# Patient Record
Sex: MALE | Race: WHITE | NOT HISPANIC OR LATINO
[De-identification: names, ages, dates, MRNs, and addresses within clinical notes are randomized per-mention and may not be internally consistent; named-entity substitution may affect disease eponyms.]

---

## 2017-04-03 ENCOUNTER — RX RENEWAL (OUTPATIENT)
Age: 62
End: 2017-04-03

## 2017-06-09 ENCOUNTER — APPOINTMENT (OUTPATIENT)
Dept: CARDIOLOGY | Facility: CLINIC | Age: 62
End: 2017-06-09

## 2017-06-30 ENCOUNTER — RX RENEWAL (OUTPATIENT)
Age: 62
End: 2017-06-30

## 2017-07-07 ENCOUNTER — NON-APPOINTMENT (OUTPATIENT)
Age: 62
End: 2017-07-07

## 2017-07-07 ENCOUNTER — APPOINTMENT (OUTPATIENT)
Dept: CARDIOLOGY | Facility: CLINIC | Age: 62
End: 2017-07-07

## 2017-07-07 VITALS
OXYGEN SATURATION: 98 % | SYSTOLIC BLOOD PRESSURE: 148 MMHG | BODY MASS INDEX: 27.26 KG/M2 | HEART RATE: 67 BPM | WEIGHT: 190 LBS | DIASTOLIC BLOOD PRESSURE: 76 MMHG

## 2017-08-24 ENCOUNTER — APPOINTMENT (OUTPATIENT)
Dept: CARDIOLOGY | Facility: CLINIC | Age: 62
End: 2017-08-24

## 2017-09-26 ENCOUNTER — APPOINTMENT (OUTPATIENT)
Dept: CARDIOLOGY | Facility: CLINIC | Age: 62
End: 2017-09-26
Payer: MEDICARE

## 2017-09-26 PROCEDURE — 93880 EXTRACRANIAL BILAT STUDY: CPT

## 2017-10-05 ENCOUNTER — MEDICATION RENEWAL (OUTPATIENT)
Age: 62
End: 2017-10-05

## 2017-12-11 RX ORDER — TADALAFIL 5 MG/1
5 TABLET, FILM COATED ORAL
Qty: 30 | Refills: 3 | Status: DISCONTINUED | COMMUNITY
Start: 2017-05-22 | End: 2017-12-11

## 2017-12-21 ENCOUNTER — APPOINTMENT (OUTPATIENT)
Dept: CARDIOLOGY | Facility: CLINIC | Age: 62
End: 2017-12-21

## 2018-01-16 ENCOUNTER — RX RENEWAL (OUTPATIENT)
Age: 63
End: 2018-01-16

## 2018-04-18 ENCOUNTER — RX RENEWAL (OUTPATIENT)
Age: 63
End: 2018-04-18

## 2018-04-27 ENCOUNTER — APPOINTMENT (OUTPATIENT)
Dept: CARDIOLOGY | Facility: CLINIC | Age: 63
End: 2018-04-27
Payer: MEDICARE

## 2018-05-21 ENCOUNTER — RX RENEWAL (OUTPATIENT)
Age: 63
End: 2018-05-21

## 2018-05-21 ENCOUNTER — NON-APPOINTMENT (OUTPATIENT)
Age: 63
End: 2018-05-21

## 2018-05-21 ENCOUNTER — APPOINTMENT (OUTPATIENT)
Dept: CARDIOLOGY | Facility: CLINIC | Age: 63
End: 2018-05-21
Payer: MEDICARE

## 2018-05-21 VITALS — BODY MASS INDEX: 27.35 KG/M2 | HEIGHT: 70 IN | WEIGHT: 191 LBS

## 2018-05-21 VITALS
WEIGHT: 191 LBS | BODY MASS INDEX: 27.35 KG/M2 | HEIGHT: 70 IN | DIASTOLIC BLOOD PRESSURE: 73 MMHG | HEART RATE: 71 BPM | SYSTOLIC BLOOD PRESSURE: 128 MMHG | OXYGEN SATURATION: 95 %

## 2018-05-21 PROCEDURE — 99214 OFFICE O/P EST MOD 30 MIN: CPT | Mod: 25

## 2018-05-21 PROCEDURE — 93000 ELECTROCARDIOGRAM COMPLETE: CPT

## 2018-06-27 ENCOUNTER — RX RENEWAL (OUTPATIENT)
Age: 63
End: 2018-06-27

## 2018-12-18 ENCOUNTER — EMERGENCY (EMERGENCY)
Facility: HOSPITAL | Age: 63
LOS: 1 days | Discharge: HOME | End: 2018-12-18
Attending: EMERGENCY MEDICINE | Admitting: EMERGENCY MEDICINE
Payer: MEDICARE

## 2018-12-18 VITALS
DIASTOLIC BLOOD PRESSURE: 94 MMHG | HEART RATE: 79 BPM | RESPIRATION RATE: 16 BRPM | SYSTOLIC BLOOD PRESSURE: 201 MMHG | HEIGHT: 70 IN | WEIGHT: 188.05 LBS | OXYGEN SATURATION: 95 % | TEMPERATURE: 99 F

## 2018-12-18 VITALS
HEART RATE: 66 BPM | SYSTOLIC BLOOD PRESSURE: 172 MMHG | RESPIRATION RATE: 16 BRPM | TEMPERATURE: 98 F | DIASTOLIC BLOOD PRESSURE: 85 MMHG | OXYGEN SATURATION: 98 %

## 2018-12-18 DIAGNOSIS — I65.22 OCCLUSION AND STENOSIS OF LEFT CAROTID ARTERY: ICD-10-CM

## 2018-12-18 DIAGNOSIS — I10 ESSENTIAL (PRIMARY) HYPERTENSION: ICD-10-CM

## 2018-12-18 DIAGNOSIS — G45.9 TRANSIENT CEREBRAL ISCHEMIC ATTACK, UNSPECIFIED: ICD-10-CM

## 2018-12-18 DIAGNOSIS — Z98.89 OTHER SPECIFIED POSTPROCEDURAL STATES: Chronic | ICD-10-CM

## 2018-12-18 DIAGNOSIS — R51 HEADACHE: ICD-10-CM

## 2018-12-18 DIAGNOSIS — R07.9 CHEST PAIN, UNSPECIFIED: ICD-10-CM

## 2018-12-18 DIAGNOSIS — I25.10 ATHEROSCLEROTIC HEART DISEASE OF NATIVE CORONARY ARTERY WITHOUT ANGINA PECTORIS: ICD-10-CM

## 2018-12-18 DIAGNOSIS — F10.10 ALCOHOL ABUSE, UNCOMPLICATED: ICD-10-CM

## 2018-12-18 LAB
ALBUMIN SERPL ELPH-MCNC: 3.7 G/DL — SIGNIFICANT CHANGE UP (ref 3.3–5)
ALP SERPL-CCNC: 62 U/L — SIGNIFICANT CHANGE UP (ref 30–120)
ALT FLD-CCNC: 29 U/L DA — SIGNIFICANT CHANGE UP (ref 10–60)
ANION GAP SERPL CALC-SCNC: 11 MMOL/L — SIGNIFICANT CHANGE UP (ref 5–17)
APPEARANCE UR: CLEAR — SIGNIFICANT CHANGE UP
AST SERPL-CCNC: 20 U/L — SIGNIFICANT CHANGE UP (ref 10–40)
BASOPHILS # BLD AUTO: 0.03 K/UL — SIGNIFICANT CHANGE UP (ref 0–0.2)
BASOPHILS NFR BLD AUTO: 0.7 % — SIGNIFICANT CHANGE UP (ref 0–2)
BILIRUB SERPL-MCNC: 0.5 MG/DL — SIGNIFICANT CHANGE UP (ref 0.2–1.2)
BILIRUB UR-MCNC: NEGATIVE — SIGNIFICANT CHANGE UP
BUN SERPL-MCNC: 11 MG/DL — SIGNIFICANT CHANGE UP (ref 7–23)
CALCIUM SERPL-MCNC: 8.5 MG/DL — SIGNIFICANT CHANGE UP (ref 8.4–10.5)
CHLORIDE SERPL-SCNC: 103 MMOL/L — SIGNIFICANT CHANGE UP (ref 96–108)
CO2 SERPL-SCNC: 26 MMOL/L — SIGNIFICANT CHANGE UP (ref 22–31)
COLOR SPEC: SIGNIFICANT CHANGE UP
CREAT SERPL-MCNC: 0.56 MG/DL — SIGNIFICANT CHANGE UP (ref 0.5–1.3)
DIFF PNL FLD: ABNORMAL
EOSINOPHIL # BLD AUTO: 0.11 K/UL — SIGNIFICANT CHANGE UP (ref 0–0.5)
EOSINOPHIL NFR BLD AUTO: 2.5 % — SIGNIFICANT CHANGE UP (ref 0–6)
ETHANOL SERPL-MCNC: <3 MG/DL — SIGNIFICANT CHANGE UP (ref 0–3)
GLUCOSE SERPL-MCNC: 114 MG/DL — HIGH (ref 70–99)
GLUCOSE UR QL: NEGATIVE — SIGNIFICANT CHANGE UP
HCT VFR BLD CALC: 43.1 % — SIGNIFICANT CHANGE UP (ref 39–50)
HGB BLD-MCNC: 15.2 G/DL — SIGNIFICANT CHANGE UP (ref 13–17)
IMM GRANULOCYTES NFR BLD AUTO: 0.2 % — SIGNIFICANT CHANGE UP (ref 0–1.5)
KETONES UR-MCNC: NEGATIVE — SIGNIFICANT CHANGE UP
LEUKOCYTE ESTERASE UR-ACNC: NEGATIVE — SIGNIFICANT CHANGE UP
LYMPHOCYTES # BLD AUTO: 1.36 K/UL — SIGNIFICANT CHANGE UP (ref 1–3.3)
LYMPHOCYTES # BLD AUTO: 31.3 % — SIGNIFICANT CHANGE UP (ref 13–44)
MCHC RBC-ENTMCNC: 32.7 PG — SIGNIFICANT CHANGE UP (ref 27–34)
MCHC RBC-ENTMCNC: 35.3 GM/DL — SIGNIFICANT CHANGE UP (ref 32–36)
MCV RBC AUTO: 92.7 FL — SIGNIFICANT CHANGE UP (ref 80–100)
MONOCYTES # BLD AUTO: 0.26 K/UL — SIGNIFICANT CHANGE UP (ref 0–0.9)
MONOCYTES NFR BLD AUTO: 6 % — SIGNIFICANT CHANGE UP (ref 2–14)
NEUTROPHILS # BLD AUTO: 2.58 K/UL — SIGNIFICANT CHANGE UP (ref 1.8–7.4)
NEUTROPHILS NFR BLD AUTO: 59.3 % — SIGNIFICANT CHANGE UP (ref 43–77)
NITRITE UR-MCNC: NEGATIVE — SIGNIFICANT CHANGE UP
PH UR: 7 — SIGNIFICANT CHANGE UP (ref 5–8)
PLATELET # BLD AUTO: 132 K/UL — LOW (ref 150–400)
POTASSIUM SERPL-MCNC: 3.8 MMOL/L — SIGNIFICANT CHANGE UP (ref 3.5–5.3)
POTASSIUM SERPL-SCNC: 3.8 MMOL/L — SIGNIFICANT CHANGE UP (ref 3.5–5.3)
PROT SERPL-MCNC: 7.5 G/DL — SIGNIFICANT CHANGE UP (ref 6–8.3)
PROT UR-MCNC: 30 MG/DL
RBC # BLD: 4.65 M/UL — SIGNIFICANT CHANGE UP (ref 4.2–5.8)
SODIUM SERPL-SCNC: 140 MMOL/L — SIGNIFICANT CHANGE UP (ref 135–145)
SP GR SPEC: 1 — LOW (ref 1.01–1.02)
TROPONIN I SERPL-MCNC: 0 NG/ML — LOW (ref 0.02–0.06)
UROBILINOGEN FLD QL: NEGATIVE — SIGNIFICANT CHANGE UP
WBC # BLD: 4.35 K/UL — SIGNIFICANT CHANGE UP (ref 3.8–10.5)
WBC # FLD AUTO: 4.35 K/UL — SIGNIFICANT CHANGE UP (ref 3.8–10.5)

## 2018-12-18 PROCEDURE — 93005 ELECTROCARDIOGRAM TRACING: CPT

## 2018-12-18 PROCEDURE — 99284 EMERGENCY DEPT VISIT MOD MDM: CPT | Mod: 25

## 2018-12-18 PROCEDURE — 36415 COLL VENOUS BLD VENIPUNCTURE: CPT

## 2018-12-18 PROCEDURE — 84484 ASSAY OF TROPONIN QUANT: CPT

## 2018-12-18 PROCEDURE — 85027 COMPLETE CBC AUTOMATED: CPT

## 2018-12-18 PROCEDURE — 99284 EMERGENCY DEPT VISIT MOD MDM: CPT

## 2018-12-18 PROCEDURE — 93010 ELECTROCARDIOGRAM REPORT: CPT

## 2018-12-18 PROCEDURE — 96360 HYDRATION IV INFUSION INIT: CPT

## 2018-12-18 PROCEDURE — 80053 COMPREHEN METABOLIC PANEL: CPT

## 2018-12-18 PROCEDURE — 81001 URINALYSIS AUTO W/SCOPE: CPT

## 2018-12-18 PROCEDURE — 80307 DRUG TEST PRSMV CHEM ANLYZR: CPT

## 2018-12-18 RX ORDER — SODIUM CHLORIDE 9 MG/ML
1000 INJECTION INTRAMUSCULAR; INTRAVENOUS; SUBCUTANEOUS ONCE
Qty: 0 | Refills: 0 | Status: COMPLETED | OUTPATIENT
Start: 2018-12-18 | End: 2018-12-18

## 2018-12-18 RX ORDER — ATENOLOL 25 MG/1
50 TABLET ORAL ONCE
Qty: 0 | Refills: 0 | Status: COMPLETED | OUTPATIENT
Start: 2018-12-18 | End: 2018-12-18

## 2018-12-18 RX ADMIN — ATENOLOL 50 MILLIGRAM(S): 25 TABLET ORAL at 12:21

## 2018-12-18 RX ADMIN — SODIUM CHLORIDE 1000 MILLILITER(S): 9 INJECTION INTRAMUSCULAR; INTRAVENOUS; SUBCUTANEOUS at 12:21

## 2018-12-18 RX ADMIN — SODIUM CHLORIDE 1000 MILLILITER(S): 9 INJECTION INTRAMUSCULAR; INTRAVENOUS; SUBCUTANEOUS at 13:30

## 2018-12-18 NOTE — ED PROVIDER NOTE - MEDICAL DECISION MAKING DETAILS
62 y/o F with dry mouth and pressure in head since this morning, admits to drinking excessive amount of etoh last night and might have forgotten to take his BP med, no neuro deficits, BP: 64 y/o F with dry mouth and pressure in head since this morning, admits to drinking excessive amount of etoh last night and might have forgotten to take his BP med, no neuro deficits, BP:193/101 now, will get ekg, labs, IVF, dose of atenolol, re-assess

## 2018-12-18 NOTE — ED PROVIDER NOTE - PROGRESS NOTE DETAILS
Pt examined by ED attending, Dr. Kaufman who agreed with disposition and plan. Attending Contribution to Care: 64 y/o M pt with hx of HTN, CAD drank excessive amount of EtOH last night, thinks he forgot to take BP pill. Woke up with extremely dry mouth and pressure in head. Denies CP, SOB, N/V, other sx. PMD: Meghan. PE: /90, NAD, neuro intact, heart and lungs normal, EKG normal. Plan: labs with alcohol level, IV fluids, may need dose of BP meds then reassess. Pt seen by her PMD, Dr. Christianson in ED, agreed with plan, manage BP and d/c home Reevaluated patient at bedside.  Patient feeling much improved. BP improved: 167/83. Discussed the results of all diagnostic testing in ED and copies of all reports given.   An opportunity to ask questions was given.  Discussed the importance of prompt, close medical follow-up.  Patient will return with any changes, concerns or persistent / worsening symptoms.  Understanding of all instructions verbalized.

## 2018-12-18 NOTE — PROGRESS NOTE ADULT - REASON FOR ADMISSION
CC Dry mouth, pressure in his head & Lt arm numbness. CC Dry mouth, pressure in his head, some minor discomfort on his left midupper chest & by phone to my office c/o Lt arm numbness.

## 2018-12-18 NOTE — ED ADULT NURSE NOTE - CHPI ED NUR SYMPTOMS NEG
no shortness of breath/no fever/no syncope/no diaphoresis/no congestion/no dizziness/no vomiting/no nausea/no back pain/no chills

## 2018-12-18 NOTE — PROGRESS NOTE ADULT - SUBJECTIVE AND OBJECTIVE BOX
· HPI Objective Statement: 62 y/o M with hx of HTN, HLD presents with c/o dry mouth x today. Pt states that he drank 5 glasses to wine, scotch and a cognac last night and thinks he forgot to take his BP medication (atenolol 50mg daily).  States that he woke up this morning with dry mouth and pressure in his head. Denies n/v, CP, SOB, abdominal pain, vision changes, numbness, tingling, focal weakness, polyuria, fever, pain or other symptoms. PMD: Dr. Christianson	  · Presenting Symptoms: dry mouth, pressure in head	  · Negative Findings: no chills, no dizziness, no fever, no nausea, no numbness, no pain, no tingling, no vomiting, no weakness	  · Timing: sudden onset	  · Duration: today	  · Aggravating Factors: NONE	  · Relieving Factors: NONE	    REVIEW OF SYSTEMS:   Review of Systems:  · CONSTITUTIONAL: no fever and no chills.	  · ENMT: - - -	  · Mouth/Throat [+]: dry mouth	  · RESPIRATORY: no chest pain, no cough, and no shortness of breath.	  · GASTROINTESTINAL: no abdominal pain, no bloating, no constipation, no diarrhea, no nausea and no vomiting.	  · NEUROLOGICAL: - - -	  · Neurological [+]: pressure in head	  · ROS STATEMENT: all other ROS negative except as per HPI	  VITAL SIGNS( [place]):   ,,ED ADULT Flow Sheet:    18-Dec-2018 11:09	  · Temp (F): 98.6	  · Temp (C) Temp (C): 37	  · Temp site Temp Site: oral	  · Heart Rate Heart Rate (beats/min): 79	  · BP Systolic Systolic: 201	  · BP Diastolic Diastolic (mm Hg): 94	  · Respiration Rate (breaths/min) Respiration Rate (breaths/min): 16	  · SpO2 (%) SpO2 (%): 95	  · O2 delivery Patient On: room air	  · How was the weight captured? Weight Type/Method: stated	  · Dosing Weight (KILOGRAMS) Dosing Weight (KILOGRAMS): 85.3	  · Dosing Weight  (POUNDS) Dosing Weight (POUNDS): 188	  · Height type Height Type: stated	  · Height (FEET) Height (FEET): 5	  · Height (INCHES) Height (INCHES): 10	  · Height (CENTIMETERS) Height (CENTIMETERS): 177.8	  · BSA (m2): 2.03	  · BMI (kG/m2) BMI (kG/m2): 27	  · Presence of Pain: complains of pain/discomfort	  · Pain Rating (0-10): Rest: 0	  · Pain Rating (0-10): Activity: 0	  · SpO2 (%) SpO2 (%): 95	  · O2 delivery Patient On: room air	    11:20	  · Presence of Pain: denies pain/discomfort	  · Pain Rating (0-10): Rest: 0	  · Pain Rating (0-10): Activity: 0	  · Preferred Language to Address Healthcare Preferred Language to Address Healthcare: English	  · Patient Belongings Patient Belongings:: Clothing	  · Extensions of Self Extensions of Self: None	    PHYSICAL EXAM:   · CONSTITUTIONAL: Well appearing, well nourished, awake, alert, oriented to person, place, time/situation and in no apparent distress.	  · ENMT: Airway patent, Nasal mucosa clear. Mouth with normal mucosa.	  · EYES: Clear bilaterally, pupils equal, round and reactive to light. EOMI	  · CARDIAC: Normal rate, regular rhythm.  Heart sounds S1, S2.	  · RESPIRATORY: Breath sounds clear and equal bilaterally.	  · GASTROINTESTINAL: Abdomen soft, non-tender, no guarding.	  · MUSCULOSKELETAL: Spine appears normal, range of motion is not limited, no muscle or joint tenderness	  · NEUROLOGICAL: Alert and oriented, no focal deficits, no motor or sensory deficits.	  · SKIN: Skin normal color for race, warm, dry and intact. No evidence of rash. · HPI Objective Statement: 64 y/o M with hx of HTN, HLD presents with c/o dry mouth x today. Pt states that he drank 5 glasses to wine, scotch and a cognac last night and thinks he forgot to take his BP medication (atenolol 50mg daily).  States that he woke up this morning with dry mouth and pressure in his head. Denies n/v, CP, SOB, abdominal pain, vision changes, numbness, tingling, focal weakness, polyuria, fever, pain or other symptoms. PMD: Dr. Christianson	  · Presenting Symptoms: dry mouth, pressure in head, some minor discomfort on his left midupper chest, non-pleuritic/ dys/odinophagia, tenderness, trauma, rash. Denied any radiation to jaw, neck, arm,	  · Negative Findings: no chills, no dizziness, no fever, no nausea, no numbness, no pain, no tingling, no vomiting, no weakness	  · Timing: sudden onset	  · Duration: today	  · Aggravating Factors: NONE	  · Relieving Factors: NONE	    REVIEW OF SYSTEMS:   Review of Systems:  · CONSTITUTIONAL: no fever and no chills.	  · ENMT: - - -	  · Mouth/Throat [+]: dry mouth	  · RESPIRATORY: no chest pain, no cough, and no shortness of breath.	  · GASTROINTESTINAL: no abdominal pain, no bloating, no constipation, no diarrhea, no nausea and no vomiting.	  · NEUROLOGICAL: - - -	  · Neurological [+]: pressure in head	  · ROS STATEMENT: all other ROS negative except as per HPI	  VITAL SIGNS( Pullset):   ,,ED ADULT Flow Sheet:    18-Dec-2018 11:09	  · Temp (F): 98.6	  · Temp (C) Temp (C): 37	  · Temp site Temp Site: oral	  · Heart Rate Heart Rate (beats/min): 79	  · BP Systolic Systolic: 201	  · BP Diastolic Diastolic (mm Hg): 94	  · Respiration Rate (breaths/min) Respiration Rate (breaths/min): 16	  · SpO2 (%) SpO2 (%): 95	  · O2 delivery Patient On: room air	  · How was the weight captured? Weight Type/Method: stated	  · Dosing Weight (KILOGRAMS) Dosing Weight (KILOGRAMS): 85.3	  · Dosing Weight  (POUNDS) Dosing Weight (POUNDS): 188	  · Height type Height Type: stated	  · Height (FEET) Height (FEET): 5	  · Height (INCHES) Height (INCHES): 10	  · Height (CENTIMETERS) Height (CENTIMETERS): 177.8	  · BSA (m2): 2.03	  · BMI (kG/m2) BMI (kG/m2): 27	  · Presence of Pain: complains of pain/discomfort	  · Pain Rating (0-10): Rest: 0	  · Pain Rating (0-10): Activity: 0	  · SpO2 (%) SpO2 (%): 95	  · O2 delivery Patient On: room air	    11:20	  · Presence of Pain: denies pain/discomfort	  · Pain Rating (0-10): Rest: 0	  · Pain Rating (0-10): Activity: 0	  · Preferred Language to Address Healthcare Preferred Language to Address Healthcare: English	  · Patient Belongings Patient Belongings:: Clothing	  · Extensions of Self Extensions of Self: None	    PHYSICAL EXAM:   · CONSTITUTIONAL: Well appearing, well nourished, awake, alert, oriented to person, place, time/situation and in no apparent distress.	  · ENMT: Airway patent, Nasal mucosa clear. Mouth with normal mucosa.	  · EYES: Clear bilaterally, pupils equal, round and reactive to light. EOMI	  · CARDIAC: Normal rate, regular rhythm.  Heart sounds S1, S2.	  · RESPIRATORY: Breath sounds clear and equal bilaterally.	  · GASTROINTESTINAL: Abdomen soft, non-tender, no guarding.	  · MUSCULOSKELETAL: Spine appears normal, range of motion is not limited, no muscle or joint tenderness	  · NEUROLOGICAL: Alert and oriented, no focal deficits, no motor or sensory deficits.	  · SKIN: Skin normal color for race, warm, dry and intact. No evidence of rash.	  Troponin I, Serum: .000: The new reference range for Troponin-I performed on the Siemens EXL  system is 0.017-0.056 ng/mL which includes the 99th percentile of a  healthy reference population. Studies have shown that elevated troponin  levels above the cutoff are associated with an increased risk for adverse  cardiac events, with the risk increasing as troponin levels increase.  As  per a joint committee of the American College of Cardiology and   Society of Cardiology, diagnosis of classic MI is based upon the  detection of a rise or fall of cardiac troponin values, with at least one  value above the 99th percentile upper reference limit, in the appropriate  clinical context.  · Troponin I (ng/mL) Interpretation  · 0.017-0.056  Normal range (includes the 99th percentile of a healthy  reference population)  · >0.056  Elevated troponin level indicating increased risk  · Note: Troponin-I and Troponin T cannot be used interchangeably in  serial measurements.  Minimally elevated Troponin results should be  interpreted in the context of clinical findings and risk factors. ng/mL (12.18.18 @ 12:26)    Comprehensive Metabolic Panel (12.18.18 @ 11:57)    Sodium, Serum: 140 mmol/L    Potassium, Serum: 3.8 mmol/L    Chloride, Serum: 103 mmol/L    Carbon Dioxide, Serum: 26 mmol/L    Anion Gap, Serum: 11 mmol/L    Blood Urea Nitrogen, Serum: 11 mg/dL    Creatinine, Serum: 0.56 mg/dL    Glucose, Serum: 114 mg/dL    Calcium, Total Serum: 8.5 mg/dL    Protein Total, Serum: 7.5 g/dL    Albumin, Serum: 3.7 g/dL    Bilirubin Total, Serum: 0.5 mg/dL    Alkaline Phosphatase, Serum: 62 U/L    Aspartate Aminotransferase (AST/SGOT): 20 U/L    Alanine Aminotransferase (ALT/SGPT): 29 U/L DA    eGFR if Non : 109: Interpretative comment  The units for eGFR are mL/min/1.73M2 (normalized body surface area). The  eGFR is calculated from a serum creatinine using the CKD-EPI equation.  Other variables required for calculation are race, age and sex. Among  patients with chronic kidney disease (CKD), the eGFR is useful in  determining the stage of disease according to KDOQI CKD classification.  All eGFR results are reported numerically with the following  interpretation.          GFR                    With                 Without     (ml/min/1.73 m2)    Kidney Damage       Kidney Damage        >= 90                    Stage 1                     Normal        60-89                    Stage 2                     Decreased GFR        30-59     Stage 3                     Stage 3        15-29                    Stage 4                     Stage 4        < 15                      Stage 5                     Stage 5  Each stage of CKD assumes that the associated GFR level has been in  effect for at least 3 months. Determination of stages one and two (with  eGFR > 59 ml/min/m2) requires estimation of kidney damage for at least 3  months as defined by structural or functional abnormalities.  Limitations: All estimates of GFR will be less accurate for patients at  extremes of muscle mass (including but not limited to frail elderly,  critically ill, or cancer patients), those with unusual diets, and those  with conditions associated with reduced secretion or extrarenal  elimination of creatinine. The eGFR equation is not recommended for use  in patients with unstable creatinine levels. mL/min/1.73M2    eGFR if African American: 127 mL/min/1.73M2    Alcohol, Blood (12.18.18 @ 11:57)    Alcohol, Blood: <3: TOXIC CONCENTRATIONS (mg/dL):  Flushing, Slowing of  Reflexes, Impaired Visual Acuity:     Depression of CNS:    > 100  Fatalities Reported:       > 400  Results reported as a "< number" are below reliably detectable limits and  considered negative  These ranges are intended as general guidelines.  Alcohol metabolism can vary widely among individuals.  This test  is approved for clinical and not for forensic purposes. mg/dL    Urine Microscopic-Add On (NC) (12.18.18 @ 12:04)    Red Blood Cell - Urine: 0-2 /HPF    White Blood Cell - Urine: 0-2    Epithelial Cells: Few    Urinalysis (12.18.18 @ 12:04)    Glucose Qualitative, Urine: Negative    Blood, Urine: Trace    pH Urine: 7.0    Color: Pale Yellow    Urine Appearance: Clear    Bilirubin: Negative    Ketone - Urine: Negative    Specific Gravity: 1.005    Protein, Urine: 30 mg/dL    Urobilinogen: Negative    Nitrite: Negative    Leukocyte Esterase Concentration: Negative    Complete Blood Count + Automated Diff (12.18.18 @ 11:57)    WBC Count: 4.35 K/uL    RBC Count: 4.65 M/uL    Hemoglobin: 15.2 g/dL    Hematocrit: 43.1 %    Mean Cell Volume: 92.7 fl    Mean Cell Hemoglobin: 32.7 pg    Mean Cell Hemoglobin Conc: 35.3 gm/dL    Platelet Count - Automated: 132 K/uL    Auto Neutrophil #: 2.58 K/uL    Auto Lymphocyte #: 1.36 K/uL    Auto Monocyte #: 0.26 K/uL    Auto Eosinophil #: 0.11 K/uL    Auto Basophil #: 0.03 K/uL    Auto Neutrophil %: 59.3: Differential percentages must be correlated with absolute numbers for  clinical significance. %    Auto Lymphocyte %: 31.3 %    Auto Monocyte %: 6.0 %    Auto Eosinophil %: 2.5 %    Auto Basophil %: 0.7 %    Auto Immature Granulocyte %: 0.2 %

## 2018-12-18 NOTE — PROGRESS NOTE ADULT - ASSESSMENT
64 yo male h/o ASHD/ episode of Pulm edema in past, HTN, HLD. Who was drinking ETOH last PM tioll 2 AM, today got worried /c dry mouth, HA & Lt chest NS discomfort, now resolved, [both HA & C/P] he thinks he forgot to take his BP Rx, SBP in ED >200.  EKG NSR, no ischemic changes or tachy/beau/ arrhythmia.  Labs CE's x 1 [-], CC non-specific.    Plan if BP controlled & asymptomatic could be discharged home, to f/u in office, continue /c his meds, 7& no ETOH.

## 2018-12-18 NOTE — ED ADULT NURSE NOTE - PMH
CAD in native artery    Carotid stenosis    Gastric reflux    Hyperlipemia    Hypertension    TIA (transient ischemic attack)

## 2018-12-18 NOTE — ED PROVIDER NOTE - OBJECTIVE STATEMENT
62 y/o M with hx of HTN, HLD presents with c/o dry mouth x today. Pt states that he drank 5 glasses to wine, scotch and a cognac last night and thinks he forgot to take his BP medication. States that he 64 y/o M with hx of HTN, HLD presents with c/o dry mouth x today. Pt states that he drank 5 glasses to wine, scotch and a cognac last night and thinks he forgot to take his BP medication (atenolol 50mg daily).  States that he woke up this morning with dry mouth and pressure in his head. Denies n/v, CP, SOB, abdominal pain, vision changes, numbness, tingling, focal weakness, polyuria, fever, pain or other symptoms.  PMD: Dr. Christianson

## 2018-12-18 NOTE — ED ADULT NURSE NOTE - NSIMPLEMENTINTERV_GEN_ALL_ED
Implemented All Universal Safety Interventions:  [place] to call system. Call bell, personal items and telephone within reach. Instruct patient to call for assistance. Room bathroom lighting operational. Non-slip footwear when patient is off stretcher. Physically safe environment: no spills, clutter or unnecessary equipment. Stretcher in lowest position, wheels locked, appropriate side rails in place.

## 2018-12-18 NOTE — ED ADULT NURSE NOTE - OBJECTIVE STATEMENT
Patient presents c/o dry mouth and feels he needs to drink a lot of water since he woke up this morning. Patient also describes an intermittent feeling in his chest localized under his left arm. Patient has no pain but states his head felt like it had pressure. Patient took his BP meds as prescribed last night but also admits to drinking alcohol last night. BP noted to be elevated and patient appears flushed in the face. Patient presents c/o dry mouth and feels he needs to drink a lot of water since he woke up this morning. Patient also describes an intermittent feeling in his chest localized under his left arm. Patient has no pain but states his head felt like it had pressure. Patient took his BP meds as prescribed last night but also admits to drinking alcohol last night more than he usually does. BP noted to be elevated and patient appears flushed in the face.

## 2019-05-07 ENCOUNTER — RX RENEWAL (OUTPATIENT)
Age: 64
End: 2019-05-07

## 2019-05-08 ENCOUNTER — RX RENEWAL (OUTPATIENT)
Age: 64
End: 2019-05-08

## 2019-06-10 ENCOUNTER — MEDICATION RENEWAL (OUTPATIENT)
Age: 64
End: 2019-06-10

## 2019-07-01 ENCOUNTER — NON-APPOINTMENT (OUTPATIENT)
Age: 64
End: 2019-07-01

## 2019-07-01 ENCOUNTER — APPOINTMENT (OUTPATIENT)
Dept: CARDIOLOGY | Facility: CLINIC | Age: 64
End: 2019-07-01
Payer: MEDICARE

## 2019-07-01 VITALS
HEIGHT: 70 IN | BODY MASS INDEX: 28.63 KG/M2 | SYSTOLIC BLOOD PRESSURE: 155 MMHG | OXYGEN SATURATION: 98 % | HEART RATE: 78 BPM | DIASTOLIC BLOOD PRESSURE: 87 MMHG | WEIGHT: 200 LBS

## 2019-07-01 PROCEDURE — 99215 OFFICE O/P EST HI 40 MIN: CPT | Mod: 25

## 2019-07-01 PROCEDURE — 93000 ELECTROCARDIOGRAM COMPLETE: CPT

## 2019-07-01 NOTE — DISCUSSION/SUMMARY
[FreeTextEntry1] : 62 Y/O gentleman with above history who presents today in routine cardiac follow up w\par Will obtain yearly Echo/carotid US \par Will obtain ETT/Myoview to eval for ischemia in pt with prior stent.

## 2019-07-01 NOTE — HISTORY OF PRESENT ILLNESS
[FreeTextEntry1] : 62 Y/O gentleman PMH: CAD/RCA stent 2013, Nuclear stress/cath 2015 widely patient RCA stent, Carotid disease S/P left endarterectomy, HTN, HLD who presents in routine cardiac follow up. Pt was recently in [place] for 8 months. Pt denies chest pain or shortness of breath but is anticipating initiation of an exercise program including jogging.

## 2019-07-02 LAB
25(OH)D3 SERPL-MCNC: 35.7 NG/ML
ALBUMIN SERPL ELPH-MCNC: 4.4 G/DL
ALP BLD-CCNC: 63 U/L
ALT SERPL-CCNC: 27 U/L
ANION GAP SERPL CALC-SCNC: 13 MMOL/L
AST SERPL-CCNC: 16 U/L
BASOPHILS # BLD AUTO: 0.03 K/UL
BASOPHILS NFR BLD AUTO: 0.6 %
BILIRUB SERPL-MCNC: 0.5 MG/DL
BUN SERPL-MCNC: 13 MG/DL
CALCIUM SERPL-MCNC: 9.2 MG/DL
CHLORIDE SERPL-SCNC: 103 MMOL/L
CHOLEST SERPL-MCNC: 154 MG/DL
CHOLEST/HDLC SERPL: 3.9 RATIO
CO2 SERPL-SCNC: 24 MMOL/L
CREAT SERPL-MCNC: 0.74 MG/DL
EOSINOPHIL # BLD AUTO: 0.21 K/UL
EOSINOPHIL NFR BLD AUTO: 4.3 %
ESTIMATED AVERAGE GLUCOSE: 103 MG/DL
GLUCOSE SERPL-MCNC: 120 MG/DL
HBA1C MFR BLD HPLC: 5.2 %
HCT VFR BLD CALC: 46.5 %
HDLC SERPL-MCNC: 40 MG/DL
HGB BLD-MCNC: 15.8 G/DL
IMM GRANULOCYTES NFR BLD AUTO: 0.2 %
LDLC SERPL CALC-MCNC: 100 MG/DL
LYMPHOCYTES # BLD AUTO: 1.85 K/UL
LYMPHOCYTES NFR BLD AUTO: 38.2 %
MAN DIFF?: NORMAL
MCHC RBC-ENTMCNC: 32.5 PG
MCHC RBC-ENTMCNC: 34 GM/DL
MCV RBC AUTO: 95.7 FL
MONOCYTES # BLD AUTO: 0.39 K/UL
MONOCYTES NFR BLD AUTO: 8.1 %
NEUTROPHILS # BLD AUTO: 2.35 K/UL
NEUTROPHILS NFR BLD AUTO: 48.6 %
PLATELET # BLD AUTO: 140 K/UL
POTASSIUM SERPL-SCNC: 4.2 MMOL/L
PROT SERPL-MCNC: 6.9 G/DL
PSA SERPL-MCNC: 0.58 NG/ML
RBC # BLD: 4.86 M/UL
RBC # FLD: 11.9 %
SODIUM SERPL-SCNC: 140 MMOL/L
T3FREE SERPL-MCNC: 3.01 PG/ML
T4 FREE SERPL-MCNC: 0.9 NG/DL
TRIGL SERPL-MCNC: 71 MG/DL
TSH SERPL-ACNC: 1.28 UIU/ML
WBC # FLD AUTO: 4.84 K/UL

## 2019-07-10 ENCOUNTER — APPOINTMENT (OUTPATIENT)
Dept: CARDIOLOGY | Facility: CLINIC | Age: 64
End: 2019-07-10
Payer: MEDICARE

## 2019-07-10 PROCEDURE — 93306 TTE W/DOPPLER COMPLETE: CPT

## 2019-07-12 ENCOUNTER — MEDICATION RENEWAL (OUTPATIENT)
Age: 64
End: 2019-07-12

## 2019-07-19 ENCOUNTER — APPOINTMENT (OUTPATIENT)
Dept: CARDIOLOGY | Facility: CLINIC | Age: 64
End: 2019-07-19
Payer: MEDICARE

## 2019-07-19 PROCEDURE — 93880 EXTRACRANIAL BILAT STUDY: CPT

## 2019-07-25 ENCOUNTER — APPOINTMENT (OUTPATIENT)
Dept: CARDIOLOGY | Facility: CLINIC | Age: 64
End: 2019-07-25
Payer: MEDICARE

## 2019-07-25 PROCEDURE — 93015 CV STRESS TEST SUPVJ I&R: CPT

## 2019-07-25 PROCEDURE — 78452 HT MUSCLE IMAGE SPECT MULT: CPT

## 2019-07-25 PROCEDURE — A9500: CPT

## 2019-10-01 ENCOUNTER — INPATIENT (INPATIENT)
Facility: HOSPITAL | Age: 64
LOS: 1 days | Discharge: HOME | DRG: 309 | End: 2019-10-03
Attending: INTERNAL MEDICINE | Admitting: INTERNAL MEDICINE
Payer: COMMERCIAL

## 2019-10-01 VITALS
TEMPERATURE: 98 F | SYSTOLIC BLOOD PRESSURE: 157 MMHG | OXYGEN SATURATION: 97 % | WEIGHT: 190.04 LBS | DIASTOLIC BLOOD PRESSURE: 106 MMHG | HEIGHT: 70 IN | HEART RATE: 144 BPM | RESPIRATION RATE: 20 BRPM

## 2019-10-01 DIAGNOSIS — Z98.89 OTHER SPECIFIED POSTPROCEDURAL STATES: Chronic | ICD-10-CM

## 2019-10-01 DIAGNOSIS — I48.91 UNSPECIFIED ATRIAL FIBRILLATION: ICD-10-CM

## 2019-10-01 LAB
ALBUMIN SERPL ELPH-MCNC: 3.9 G/DL — SIGNIFICANT CHANGE UP (ref 3.3–5)
ALP SERPL-CCNC: 70 U/L — SIGNIFICANT CHANGE UP (ref 30–120)
ALT FLD-CCNC: 45 U/L DA — SIGNIFICANT CHANGE UP (ref 10–60)
ANION GAP SERPL CALC-SCNC: 10 MMOL/L — SIGNIFICANT CHANGE UP (ref 5–17)
APTT BLD: 26.9 SEC — LOW (ref 28.5–37)
AST SERPL-CCNC: 21 U/L — SIGNIFICANT CHANGE UP (ref 10–40)
BILIRUB SERPL-MCNC: 0.6 MG/DL — SIGNIFICANT CHANGE UP (ref 0.2–1.2)
BUN SERPL-MCNC: 14 MG/DL — SIGNIFICANT CHANGE UP (ref 7–23)
CALCIUM SERPL-MCNC: 9.1 MG/DL — SIGNIFICANT CHANGE UP (ref 8.4–10.5)
CHLORIDE SERPL-SCNC: 105 MMOL/L — SIGNIFICANT CHANGE UP (ref 96–108)
CO2 SERPL-SCNC: 26 MMOL/L — SIGNIFICANT CHANGE UP (ref 22–31)
CREAT SERPL-MCNC: 0.79 MG/DL — SIGNIFICANT CHANGE UP (ref 0.5–1.3)
ETHANOL SERPL-MCNC: <3 MG/DL — SIGNIFICANT CHANGE UP (ref 0–3)
GLUCOSE SERPL-MCNC: 102 MG/DL — HIGH (ref 70–99)
HCT VFR BLD CALC: 46.9 % — SIGNIFICANT CHANGE UP (ref 39–50)
HGB BLD-MCNC: 16.4 G/DL — SIGNIFICANT CHANGE UP (ref 13–17)
INR BLD: 1.12 RATIO — SIGNIFICANT CHANGE UP (ref 0.88–1.16)
MCHC RBC-ENTMCNC: 32.5 PG — SIGNIFICANT CHANGE UP (ref 27–34)
MCHC RBC-ENTMCNC: 35 GM/DL — SIGNIFICANT CHANGE UP (ref 32–36)
MCV RBC AUTO: 93.1 FL — SIGNIFICANT CHANGE UP (ref 80–100)
NRBC # BLD: 0 /100 WBCS — SIGNIFICANT CHANGE UP (ref 0–0)
NT-PROBNP SERPL-SCNC: 1315 PG/ML — HIGH (ref 0–125)
PLATELET # BLD AUTO: 139 K/UL — LOW (ref 150–400)
POTASSIUM SERPL-MCNC: 3.5 MMOL/L — SIGNIFICANT CHANGE UP (ref 3.5–5.3)
POTASSIUM SERPL-SCNC: 3.5 MMOL/L — SIGNIFICANT CHANGE UP (ref 3.5–5.3)
PROT SERPL-MCNC: 7.7 G/DL — SIGNIFICANT CHANGE UP (ref 6–8.3)
PROTHROM AB SERPL-ACNC: 12.2 SEC — SIGNIFICANT CHANGE UP (ref 10–12.9)
RBC # BLD: 5.04 M/UL — SIGNIFICANT CHANGE UP (ref 4.2–5.8)
RBC # FLD: 11.9 % — SIGNIFICANT CHANGE UP (ref 10.3–14.5)
SODIUM SERPL-SCNC: 141 MMOL/L — SIGNIFICANT CHANGE UP (ref 135–145)
TROPONIN I SERPL-MCNC: 0.07 NG/ML — HIGH (ref 0.02–0.06)
WBC # BLD: 6.66 K/UL — SIGNIFICANT CHANGE UP (ref 3.8–10.5)
WBC # FLD AUTO: 6.66 K/UL — SIGNIFICANT CHANGE UP (ref 3.8–10.5)

## 2019-10-01 PROCEDURE — 93010 ELECTROCARDIOGRAM REPORT: CPT

## 2019-10-01 PROCEDURE — 99223 1ST HOSP IP/OBS HIGH 75: CPT

## 2019-10-01 PROCEDURE — 71046 X-RAY EXAM CHEST 2 VIEWS: CPT | Mod: 26

## 2019-10-01 PROCEDURE — 99285 EMERGENCY DEPT VISIT HI MDM: CPT

## 2019-10-01 RX ORDER — ASPIRIN/CALCIUM CARB/MAGNESIUM 324 MG
325 TABLET ORAL ONCE
Refills: 0 | Status: COMPLETED | OUTPATIENT
Start: 2019-10-01 | End: 2019-10-01

## 2019-10-01 RX ORDER — DILTIAZEM HCL 120 MG
10 CAPSULE, EXT RELEASE 24 HR ORAL ONCE
Refills: 0 | Status: COMPLETED | OUTPATIENT
Start: 2019-10-01 | End: 2019-10-01

## 2019-10-01 RX ADMIN — Medication 325 MILLIGRAM(S): at 21:17

## 2019-10-01 RX ADMIN — Medication 10 MILLIGRAM(S): at 21:17

## 2019-10-01 NOTE — ED PROVIDER NOTE - ATTENDING CONTRIBUTION TO CARE
63y old alcohol use, presents with elevated bp, possibel alcohol withdrawal, plan to chesk labs, repeat trop, chest xray, repeat ciwa, I personally saw the patient with the PA, and completed the key components of the history and physical exam. I then discussed the management plan with the PA.

## 2019-10-01 NOTE — H&P ADULT - NSHPREVIEWOFSYSTEMS_GEN_ALL_CORE
-      CONSTITUTIONAL: No fever, weight loss, or fatigue.  EYES: No eye pain, visual disturbances, or discharge.  ENMT:  No difficulty hearing, tinnitus, vertigo; No sinus or throat pain.  NECK: No pain or stiffness.	  RESPIRATORY: No cough, wheezing, or hemoptysis; No shortness of breath.  CARDIOVASCULAR: No chest pain, palpitations, dizziness, or leg swelling.  GASTROINTESTINAL: No abdominal pain currently, no nausea, vomiting, or hematemesis; No diarrhea or Change in bowel habits. No melena or hematochezia.  GENITOURINARY: No dysuria, frequency, hematuria, or incontinence.  NEUROLOGICAL: No headaches, focal muscle weakness, numbness, or tremors.  SKIN: No itching, burning or rashes.  MUSCULOSKELETAL: No joint swelling or pain.  PSYCHIATRIC: No depression, anxiety, or agitation.  HEME/LYMPH: No easy bruising, bleeding gums, or nose bleed.  ALLERGY AND IMMUNOLOGIC: No hives or eczema.

## 2019-10-01 NOTE — ED PROVIDER NOTE - OBJECTIVE STATEMENT
64 yo M PMHx HTN, HLD, carotid stenosis, TIA, CAD s/p stent placement 2014 presents to ED c/o dry mouth. States he took his BP today, routinely, noted to be 220/110. Pt alarmed at number, and states his stomach started feeling "tight"- symptom has since resolved. Pt comfortable, denies acute complaints currently. States dry mouth is chronic. Denies chest pain, palpitations, SOB, fever/chills, abd pain, N/V. Pt admits to daily alcohol use- about 2 glasses wine/day- last drink was this afternoon at lunch.   CIWA 0

## 2019-10-01 NOTE — H&P ADULT - PROBLEM SELECTOR PLAN 4
IMPROVE VTE Individual Risk Assessment          RISK                                                          Points    [  ] Previous VTE                                                3  [  ] Thrombophilia                                             2  [  ] Lower limb paralysis                                    2       (unable to hold up >15 seconds)    [  ] Current Cancer                                             2         (within 6 months)  [ x ] Immobilization > 24 hrs      (expected)          1  [  ] ICU/CCU stay > 24 hours                            1  [ x ] Age > 60                                                    1    IMPROVE VTE Score 2.    **IMPROVE score of 2 in addition to the other risk factors not included in this scoring system, deferred long term anticoagulation as stated above, started him for now on UFH 5000 units subcutaneous every 8 hours for DVT prophylaxis.

## 2019-10-01 NOTE — H&P ADULT - NSICDXPASTMEDICALHX_GEN_ALL_CORE_FT
PAST MEDICAL HISTORY:  CAD in native artery     Carotid stenosis     Gastric reflux     Hyperlipemia     Hypertension     TIA (transient ischemic attack)

## 2019-10-01 NOTE — H&P ADULT - NSHPLABSRESULTS_GEN_ALL_CORE
-      Labs:    Lactate Trend                          16.4   6.66  )-----------( 139      ( 01 Oct 2019 20:49 )             46.9            10-01    141  |  105  |  14  ----------------------------<  102<H>  3.5   |  26  |  0.79    Ca    9.1      01 Oct 2019 20:49    TPro  7.7  /  Alb  3.9  /  TBili  0.6  /  DBili  x   /  AST  21  /  ALT  45  /  AlkPhos  70  10-01            PT/INR - ( 01 Oct 2019 20:49 )   PT: 12.2 sec;   INR: 1.12 ratio         PTT - ( 01 Oct 2019 20:49 )  PTT:26.9 sec  CARDIAC MARKERS ( 02 Oct 2019 02:37 )  .055 ng/mL / x     / x     / x     / x      CARDIAC MARKERS ( 01 Oct 2019 20:49 )  .065 ng/mL / x     / x     / x     / x -      Labs:    Lactate Trend                          16.4   6.66  )-----------( 139      ( 01 Oct 2019 20:49 )             46.9            10-01    141  |  105  |  14  ----------------------------<  102<H>  3.5   |  26  |  0.79    Ca    9.1      01 Oct 2019 20:49    TPro  7.7  /  Alb  3.9  /  TBili  0.6  /  DBili  x   /  AST  21  /  ALT  45  /  AlkPhos  70  10-01            PT/INR - ( 01 Oct 2019 20:49 )   PT: 12.2 sec;   INR: 1.12 ratio         PTT - ( 01 Oct 2019 20:49 )  PTT:26.9 sec  CARDIAC MARKERS ( 02 Oct 2019 02:37 )  .055 ng/mL / x     / x     / x     / x      CARDIAC MARKERS ( 01 Oct 2019 20:49 )  .065 ng/mL / x     / x     / x     / x        CXR:	    As per my review shows normal cardiac shadow size, clear lung fields B/L, no pulmonary infiltrates, pleural effusion, or pneumothorax. Pending official report.        EKG:    As per my review shows A. Fib with RVR at 150/min, normal QRS voltage, duration, and axis (zero), with normal transition, nonspecific ST-T abnormality.      -

## 2019-10-01 NOTE — ED PROVIDER NOTE - CONSTITUTIONAL, MLM
normal... Well appearing, well nourished, awake, alert, oriented to person, place, time/situation and in no apparent distress. CIWA 0

## 2019-10-01 NOTE — ED ADULT TRIAGE NOTE - CHIEF COMPLAINT QUOTE
" I saw my Dr, Dr. Christianson 3 weeks ago and he told me to check my BP sometimes. Today I checked it and it is very high and my mouth is very dry and my stomach feels tight and full but I have no pain

## 2019-10-01 NOTE — ED PROVIDER NOTE - CLINICAL SUMMARY MEDICAL DECISION MAKING FREE TEXT BOX
64 yo M p/w new-onset A fib, likely incidental finding---> EKG, labs 64 yo M p/w elevated BP/new-onset A fib---> EKG, labs,TBA

## 2019-10-01 NOTE — H&P ADULT - HISTORY OF PRESENT ILLNESS
This is a 62 y/o M with PMH of HTN, Dyslipidemia, CAD s/p PCI, PAD s/p CEA, TIA, and GERD, who presented with elevated BP. Patient states that he was routinely checking his BP at home when he realized he has "very High BP", 180/100, and was feeling tightness in his belly, so came to the ED to be checked. At the ED he was found to have A. Fib with RVR. Denies any palpitations awareness, chest pain, dizziness, SOB, nausea, vomiting, or diaphoresis. As per patient he had a stress test at his cardiologist office about 3 weeks ago, and it was "OK".

## 2019-10-01 NOTE — H&P ADULT - NSHPPHYSICALEXAM_GEN_ALL_CORE
-    Vital Signs Last 24 Hrs  T(C): 36.7 (01 Oct 2019 20:30), Max: 36.8 (01 Oct 2019 20:06)  T(F): 98 (01 Oct 2019 20:30), Max: 98.2 (01 Oct 2019 20:06)  HR: 88 (01 Oct 2019 21:45) (88 - 144)  BP: 132/87 (01 Oct 2019 21:45) (132/87 - 157/106)  BP(mean): --  RR: 15 (01 Oct 2019 21:45) (15 - 20)  SpO2: 98% (01 Oct 2019 21:45) (97% - 98%)          PHYSICAL EXAM:	  	  GENERAL: NAD, well-groomed, well-developed.  HEAD:  Atraumatic, Norm cephalic, flushed face.  EYES: PERRLA, conjunctiva clear.  ENMT: no nasal discharge, no flor[place]-pharyngeal erythema or exudates, MMM.   NECK: Supple, No JVD.  NERVOUS SYSTEM:  Alert & oriented X3, neurologically intact grossly.  CHEST/LUNG: Good air entry B/L, no rales, rhonchi, or wheezing.  HEART: Variable S1 & normal S2, no murmurs, or extra sounds.  ABDOMEN: Soft, non-tender, non-distended; bowel sounds present, no palpable masses or organomegaly.  EXTREMITIES:  No clubbing, cyanosis, or edema.  VASCULAR: 2+ radial, DPA / PTA pulses B/L.  SKIN: No rashes or lesions.  PSYCH: normal affect & behavior.

## 2019-10-02 DIAGNOSIS — I48.91 UNSPECIFIED ATRIAL FIBRILLATION: ICD-10-CM

## 2019-10-02 DIAGNOSIS — I48.0 PAROXYSMAL ATRIAL FIBRILLATION: ICD-10-CM

## 2019-10-02 DIAGNOSIS — R03.0 ELEVATED BLOOD-PRESSURE READING, WITHOUT DIAGNOSIS OF HYPERTENSION: ICD-10-CM

## 2019-10-02 DIAGNOSIS — I10 ESSENTIAL (PRIMARY) HYPERTENSION: ICD-10-CM

## 2019-10-02 DIAGNOSIS — R79.89 OTHER SPECIFIED ABNORMAL FINDINGS OF BLOOD CHEMISTRY: ICD-10-CM

## 2019-10-02 DIAGNOSIS — I25.10 ATHEROSCLEROTIC HEART DISEASE OF NATIVE CORONARY ARTERY WITHOUT ANGINA PECTORIS: ICD-10-CM

## 2019-10-02 DIAGNOSIS — D69.6 THROMBOCYTOPENIA, UNSPECIFIED: ICD-10-CM

## 2019-10-02 DIAGNOSIS — Z29.9 ENCOUNTER FOR PROPHYLACTIC MEASURES, UNSPECIFIED: ICD-10-CM

## 2019-10-02 LAB
ANION GAP SERPL CALC-SCNC: 8 MMOL/L — SIGNIFICANT CHANGE UP (ref 5–17)
BUN SERPL-MCNC: 12 MG/DL — SIGNIFICANT CHANGE UP (ref 7–23)
CALCIUM SERPL-MCNC: 9 MG/DL — SIGNIFICANT CHANGE UP (ref 8.4–10.5)
CHLORIDE SERPL-SCNC: 106 MMOL/L — SIGNIFICANT CHANGE UP (ref 96–108)
CO2 SERPL-SCNC: 26 MMOL/L — SIGNIFICANT CHANGE UP (ref 22–31)
CREAT SERPL-MCNC: 0.7 MG/DL — SIGNIFICANT CHANGE UP (ref 0.5–1.3)
GLUCOSE SERPL-MCNC: 113 MG/DL — HIGH (ref 70–99)
HCT VFR BLD CALC: 48.6 % — SIGNIFICANT CHANGE UP (ref 39–50)
HCV AB S/CO SERPL IA: 0.09 S/CO — SIGNIFICANT CHANGE UP (ref 0–0.99)
HCV AB SERPL-IMP: SIGNIFICANT CHANGE UP
HGB BLD-MCNC: 16.5 G/DL — SIGNIFICANT CHANGE UP (ref 13–17)
MAGNESIUM SERPL-MCNC: 2 MG/DL — SIGNIFICANT CHANGE UP (ref 1.6–2.6)
MCHC RBC-ENTMCNC: 32.1 PG — SIGNIFICANT CHANGE UP (ref 27–34)
MCHC RBC-ENTMCNC: 34 GM/DL — SIGNIFICANT CHANGE UP (ref 32–36)
MCV RBC AUTO: 94.6 FL — SIGNIFICANT CHANGE UP (ref 80–100)
NRBC # BLD: 0 /100 WBCS — SIGNIFICANT CHANGE UP (ref 0–0)
PLATELET # BLD AUTO: 139 K/UL — LOW (ref 150–400)
POTASSIUM SERPL-MCNC: 3.6 MMOL/L — SIGNIFICANT CHANGE UP (ref 3.5–5.3)
POTASSIUM SERPL-SCNC: 3.6 MMOL/L — SIGNIFICANT CHANGE UP (ref 3.5–5.3)
RBC # BLD: 5.14 M/UL — SIGNIFICANT CHANGE UP (ref 4.2–5.8)
RBC # FLD: 12 % — SIGNIFICANT CHANGE UP (ref 10.3–14.5)
SODIUM SERPL-SCNC: 140 MMOL/L — SIGNIFICANT CHANGE UP (ref 135–145)
TROPONIN I SERPL-MCNC: 0.06 NG/ML — SIGNIFICANT CHANGE UP (ref 0.02–0.06)
WBC # BLD: 6.5 K/UL — SIGNIFICANT CHANGE UP (ref 3.8–10.5)
WBC # FLD AUTO: 6.5 K/UL — SIGNIFICANT CHANGE UP (ref 3.8–10.5)

## 2019-10-02 PROCEDURE — 93306 TTE W/DOPPLER COMPLETE: CPT | Mod: 26

## 2019-10-02 PROCEDURE — 99233 SBSQ HOSP IP/OBS HIGH 50: CPT

## 2019-10-02 PROCEDURE — 99223 1ST HOSP IP/OBS HIGH 75: CPT

## 2019-10-02 RX ORDER — AMLODIPINE BESYLATE 2.5 MG/1
5 TABLET ORAL ONCE
Refills: 0 | Status: COMPLETED | OUTPATIENT
Start: 2019-10-02 | End: 2019-10-02

## 2019-10-02 RX ORDER — HEPARIN SODIUM 5000 [USP'U]/ML
5000 INJECTION INTRAVENOUS; SUBCUTANEOUS EVERY 8 HOURS
Refills: 0 | Status: DISCONTINUED | OUTPATIENT
Start: 2019-10-02 | End: 2019-10-02

## 2019-10-02 RX ORDER — DILTIAZEM HCL 120 MG
90 CAPSULE, EXT RELEASE 24 HR ORAL EVERY 8 HOURS
Refills: 0 | Status: DISCONTINUED | OUTPATIENT
Start: 2019-10-02 | End: 2019-10-03

## 2019-10-02 RX ORDER — ATORVASTATIN CALCIUM 80 MG/1
40 TABLET, FILM COATED ORAL AT BEDTIME
Refills: 0 | Status: DISCONTINUED | OUTPATIENT
Start: 2019-10-02 | End: 2019-10-03

## 2019-10-02 RX ORDER — AMLODIPINE BESYLATE 2.5 MG/1
5 TABLET ORAL DAILY
Refills: 0 | Status: DISCONTINUED | OUTPATIENT
Start: 2019-10-02 | End: 2019-10-02

## 2019-10-02 RX ORDER — ATENOLOL 25 MG/1
75 TABLET ORAL AT BEDTIME
Refills: 0 | Status: DISCONTINUED | OUTPATIENT
Start: 2019-10-02 | End: 2019-10-03

## 2019-10-02 RX ORDER — ATENOLOL 25 MG/1
50 TABLET ORAL AT BEDTIME
Refills: 0 | Status: DISCONTINUED | OUTPATIENT
Start: 2019-10-02 | End: 2019-10-02

## 2019-10-02 RX ORDER — APIXABAN 2.5 MG/1
5 TABLET, FILM COATED ORAL EVERY 12 HOURS
Refills: 0 | Status: DISCONTINUED | OUTPATIENT
Start: 2019-10-02 | End: 2019-10-03

## 2019-10-02 RX ORDER — ASPIRIN/CALCIUM CARB/MAGNESIUM 324 MG
81 TABLET ORAL DAILY
Refills: 0 | Status: DISCONTINUED | OUTPATIENT
Start: 2019-10-02 | End: 2019-10-03

## 2019-10-02 RX ADMIN — ATENOLOL 50 MILLIGRAM(S): 25 TABLET ORAL at 00:41

## 2019-10-02 RX ADMIN — AMLODIPINE BESYLATE 5 MILLIGRAM(S): 2.5 TABLET ORAL at 13:39

## 2019-10-02 RX ADMIN — ATENOLOL 75 MILLIGRAM(S): 25 TABLET ORAL at 22:06

## 2019-10-02 RX ADMIN — Medication 90 MILLIGRAM(S): at 19:47

## 2019-10-02 RX ADMIN — AMLODIPINE BESYLATE 5 MILLIGRAM(S): 2.5 TABLET ORAL at 10:57

## 2019-10-02 RX ADMIN — HEPARIN SODIUM 5000 UNIT(S): 5000 INJECTION INTRAVENOUS; SUBCUTANEOUS at 06:30

## 2019-10-02 RX ADMIN — APIXABAN 5 MILLIGRAM(S): 2.5 TABLET, FILM COATED ORAL at 22:06

## 2019-10-02 RX ADMIN — Medication 81 MILLIGRAM(S): at 11:01

## 2019-10-02 RX ADMIN — ATORVASTATIN CALCIUM 40 MILLIGRAM(S): 80 TABLET, FILM COATED ORAL at 22:06

## 2019-10-02 RX ADMIN — APIXABAN 5 MILLIGRAM(S): 2.5 TABLET, FILM COATED ORAL at 10:57

## 2019-10-02 NOTE — ED ADULT NURSE NOTE - NSIMPLEMENTINTERV_GEN_ALL_ED
Implemented All Universal Safety Interventions:  Coin to call system. Call bell, personal items and telephone within reach. Instruct patient to call for assistance. Room bathroom lighting operational. Non-slip footwear when patient is off stretcher. Physically safe environment: no spills, clutter or unnecessary equipment. Stretcher in lowest position, wheels locked, appropriate side rails in place.

## 2019-10-02 NOTE — CONSULT NOTE ADULT - PROBLEM SELECTOR RECOMMENDATION 9
Dc Norvasc.  Start Diltazem for BP and rate control along with Atenlolol increase as well). Dread Vasc score 5 so will start eliquis.  If not in NSR toño. , will have DALE/CV.

## 2019-10-02 NOTE — PROGRESS NOTE ADULT - PROBLEM SELECTOR PLAN 5
IMPROVE VTE Individual Risk Assessment          RISK                                                          Points    [  ] Previous VTE                                                3  [  ] Thrombophilia                                             2  [  ] Lower limb paralysis                                    2       (unable to hold up >15 seconds)    [  ] Current Cancer                                             2         (within 6 months)  [ x ] Immobilization > 24 hrs      (expected)          1  [  ] ICU/CCU stay > 24 hours                            1  [ x ] Age > 60                                                    1    IMPROVE VTE Score 2.    On reina now.

## 2019-10-02 NOTE — ED ADULT NURSE NOTE - RESPIRATORY ASSESSMENT
Saline Nose Drops   To help clear a stuffy nose, put salt water (saline) nose drops in your infant's nose. This helps to loosen the secretions in the nose. Use a bulb syringe to clean the nose out:  · Before feeding.  · Before putting your infant down for naps.  · No more than once every 3 hours to avoid irritating your infant's nostrils.  HOME CARE  · Buy nose drops at your local drug store. You can also make nose drops yourself. Mix 1 cup of water with ½ teaspoon of salt. Stir. Store this mixture at room temperature. Make a new batch daily.  · To use the drops:  · Put 1 or 2 drops in each side of infant's nose with a clean medicine dropper. Do not use this dropper for any other medicine.  · Squeeze the air out of the suction bulb before inserting it into your infant's nose.  · While still squeezing the bulb flat, place the tip of the bulb into a nostril. Let air come back into the bulb. The suction will pull snot out of the nose and into the bulb.  · Repeat on other nostril.  · Squeeze the bulb several times into a tissue and wash the bulb tip in soapy water. Store the bulb with the tip side down on paper towel.  · Use the bulb syringe with only the saline drops to avoid irritating your infant's nostrils.  GET HELP RIGHT AWAY IF:  · The snot changes to green or yellow.  · The snot gets thicker.  · Your infant is 3 months or younger with a rectal temperature of 100.4° F (38° C) or higher.  · Your infant is older than 3 months with a rectal temperature of 102° F (38.9° C) or higher.  · The stuffy nose lasts 10 days or longer.  · There is trouble breathing or feeding.  MAKE SURE YOU:  · Understand these instructions.  · Will watch your infant's condition.  · Will get help right away if your infant is not doing well or gets worse.  Document Released: 10/15/2010 Document Revised: 03/11/2013 Document Reviewed: 10/15/2010  ExitCare® Patient Information ©2014 [employer].    
WDL

## 2019-10-02 NOTE — PROGRESS NOTE ADULT - SUBJECTIVE AND OBJECTIVE BOX
Patient is a 63y old  Male who presents with a chief complaint of Came for high BP. (01 Oct 2019 23:37)    HPI:  This is a 62 y/o M with PMH of HTN, Dyslipidemia, CAD s/p PCI, PAD s/p CEA, TIA, and GERD, who presented with elevated BP. Patient states that he was routinely checking his BP at home when he realized he has "very High BP", 180/100, and was feeling tightness in his belly, so came to the ED to be checked. At the ED he was found to have A. Fib with RVR. Denies any palpitations awareness, chest pain, dizziness, SOB, nausea, vomiting, or diaphoresis. As per patient he had a stress test at his cardiologist office about 3 weeks ago, and it was "OK". (01 Oct 2019 23:37)      INTERVAL HPI/OVERNIGHT EVENTS:    Pt denies any chest pain or SOB.  He is feeling fine.     MEDICATIONS  (STANDING):  amLODIPine   Tablet 5 milliGRAM(s) Oral daily  apixaban 5 milliGRAM(s) Oral every 12 hours  aspirin enteric coated 81 milliGRAM(s) Oral daily  ATENolol  Tablet 50 milliGRAM(s) Oral at bedtime  atorvastatin 40 milliGRAM(s) Oral at bedtime    MEDICATIONS  (PRN):      Allergies    No Known Allergies    Intolerances        REVIEW OF SYSTEMS:  CONSTITUTIONAL: No fever, weight loss, or fatigue  EYES: No eye pain, visual disturbances, or discharge  ENMT:  No difficulty hearing, tinnitus, vertigo; No sinus or throat pain  NECK: No pain or stiffness  BREASTS: No pain, masses, or nipple discharge  RESPIRATORY: No cough, wheezing, chills or hemoptysis; No shortness of breath  CARDIOVASCULAR: No chest pain, palpitations, lightheadedness, or leg swelling  GASTROINTESTINAL: No abdominal or epigastric pain. No nausea, vomiting, or hematemesis; No diarrhea or constipation. No melena or hematochezia.  GENITOURINARY: No dysuria, frequency, hematuria, or incontinence  NEUROLOGICAL: No headaches, memory loss, vertigo, loss of strength, numbness, or tremors  SKIN: No itching, burning, rashes, or lesions   LYMPH NODES: No enlarged glands  ENDOCRINE: No heat or cold intolerance; No hair loss; No polydipsia or polyuria  MUSCULOSKELETAL: No joint pain or swelling; No muscle, back, or extremity pain  PSYCHIATRIC: No depression, anxiety, or mood swings  HEME/LYMPH: No easy bruising, or bleeding gums  ALLERGY AND IMMUNOLOGIC: No hives or eczema    Vital Signs Last 24 Hrs  T(C): 36.8 (02 Oct 2019 08:05), Max: 36.8 (01 Oct 2019 20:06)  T(F): 98.3 (02 Oct 2019 08:05), Max: 98.3 (02 Oct 2019 08:05)  HR: 75 (02 Oct 2019 08:05) (70 - 144)  BP: 175/98 (02 Oct 2019 08:05) (112/69 - 175/98)  BP(mean): --  RR: 18 (02 Oct 2019 08:05) (14 - 20)  SpO2: 96% (02 Oct 2019 08:05) (96% - 100%)    PHYSICAL EXAM:	  		  	GENERAL: NAD, well-groomed, well-developed.  	HEAD:  Atraumatic, Norm cephalic, flushed face.  	EYES: PERRLA, conjunctiva clear.  	ENMT: no nasal discharge, no flor[place]-pharyngeal erythema or exudates, MMM.   	NECK: Supple, No JVD.  	NERVOUS SYSTEM:  Alert & oriented X3, neurologically intact grossly.  	CHEST/LUNG: Good air entry B/L, no rales, rhonchi, or wheezing.  	HEART: Variable S1 & normal S2, no murmurs, or extra sounds.  	ABDOMEN: Soft, non-tender, non-distended; bowel sounds present, no palpable masses or organomegaly.  	EXTREMITIES:  No clubbing, cyanosis, or edema.  	VASCULAR: 2+ radial, DPA / PTA pulses B/L.  	SKIN: No rashes or lesions.  PSYCH: normal affect & behavior.    LABS:                        16.5   6.50  )-----------( 139      ( 02 Oct 2019 05:30 )             48.6     02 Oct 2019 05:30    140    |  106    |  12     ----------------------------<  113    3.6     |  26     |  0.70     Ca    9.0        02 Oct 2019 05:30  Mg     2.0       02 Oct 2019 05:30    TPro  7.7    /  Alb  3.9    /  TBili  0.6    /  DBili  x      /  AST  21     /  ALT  45     /  AlkPhos  70     01 Oct 2019 20:49    PT/INR - ( 01 Oct 2019 20:49 )   PT: 12.2 sec;   INR: 1.12 ratio         PTT - ( 01 Oct 2019 20:49 )  PTT:26.9 sec    CAPILLARY BLOOD GLUCOSE          RADIOLOGY & ADDITIONAL TESTS:    Imaging Personally Reviewed:  [ ] YES     Consultant(s) Notes Reviewed:      Care Discussed with Consultants/Other Providers:    Advanced Directives: [ ] DNR  [ ] No feeding tube  [ ] MOLST in chart  [ ] MOLST completed today  [ ] Unknown Patient is a 63y old  Male who presents with a chief complaint of Came for high BP. (01 Oct 2019 23:37)    HPI:  This is a 62 y/o M with PMH of HTN, Dyslipidemia, CAD s/p PCI, PAD s/p CEA, TIA, and GERD, who presented with elevated BP. Patient states that he was routinely checking his BP at home when he realized he has "very High BP", 180/100, and was feeling tightness in his belly, so came to the ED to be checked. At the ED he was found to have A. Fib with RVR. Denies any palpitations awareness, chest pain, dizziness, SOB, nausea, vomiting, or diaphoresis. As per patient he had a stress test at his cardiologist office about 3 weeks ago, and it was "OK". (01 Oct 2019 23:37)      INTERVAL HPI/OVERNIGHT EVENTS:    Pt denies any chest pain or SOB.  He is feeling fine.   I spoke with Dr. Christianson who is the patient's PMD.  He will take over his care tomorrow.    MEDICATIONS  (STANDING):  amLODIPine   Tablet 5 milliGRAM(s) Oral daily  apixaban 5 milliGRAM(s) Oral every 12 hours  aspirin enteric coated 81 milliGRAM(s) Oral daily  ATENolol  Tablet 50 milliGRAM(s) Oral at bedtime  atorvastatin 40 milliGRAM(s) Oral at bedtime    MEDICATIONS  (PRN):      Allergies    No Known Allergies    Intolerances        REVIEW OF SYSTEMS:  CONSTITUTIONAL: No fever, weight loss, or fatigue  EYES: No eye pain, visual disturbances, or discharge  ENMT:  No difficulty hearing, tinnitus, vertigo; No sinus or throat pain  NECK: No pain or stiffness  RESPIRATORY: No cough, wheezing, chills or hemoptysis; No shortness of breath  CARDIOVASCULAR: No chest pain, palpitations, lightheadedness, or leg swelling  GASTROINTESTINAL: No abdominal or epigastric pain. No nausea, vomiting, or hematemesis; No diarrhea or constipation. No melena or hematochezia.  GENITOURINARY: No dysuria, frequency, hematuria, or incontinence  NEUROLOGICAL: No headaches, memory loss, vertigo, loss of strength, numbness, or tremors  SKIN: No itching, burning, rashes, or lesions   LYMPH NODES: No enlarged glands  ENDOCRINE: No heat or cold intolerance; No hair loss; No polydipsia or polyuria  MUSCULOSKELETAL: No joint pain or swelling; No muscle, back, or extremity pain  PSYCHIATRIC: No depression, anxiety, or mood swings  HEME/LYMPH: No easy bruising, or bleeding gums  ALLERGY AND IMMUNOLOGIC: No hives or eczema    Vital Signs Last 24 Hrs  T(C): 36.8 (02 Oct 2019 08:05), Max: 36.8 (01 Oct 2019 20:06)  T(F): 98.3 (02 Oct 2019 08:05), Max: 98.3 (02 Oct 2019 08:05)  HR: 75 (02 Oct 2019 08:05) (70 - 144)  BP: 175/98 (02 Oct 2019 08:05) (112/69 - 175/98)  BP(mean): --  RR: 18 (02 Oct 2019 08:05) (14 - 20)  SpO2: 96% (02 Oct 2019 08:05) (96% - 100%)    PHYSICAL EXAM:	  		  	GENERAL: NAD, well-groomed, well-developed.  	HEAD:  Atraumatic, Norm cephalic, flushed face.  	EYES: PERRLA, conjunctiva clear.  	ENMT: no nasal discharge, no flor[place]-pharyngeal erythema or exudates, MMM.   	NECK: Supple, No JVD.  	NERVOUS SYSTEM:  Alert & oriented X3, neurologically intact grossly.  	CHEST/LUNG: Good air entry B/L, no rales, rhonchi, or wheezing.  	HEART: Variable S1 & normal S2, no murmurs, or extra sounds.  	ABDOMEN: Soft, non-tender, non-distended; bowel sounds present, no palpable masses or organomegaly.  	EXTREMITIES:  No clubbing, cyanosis, or edema.  	VASCULAR: 2+ radial, DPA / PTA pulses B/L.  	SKIN: No rashes or lesions.  PSYCH: normal affect & behavior.    LABS:                        16.5   6.50  )-----------( 139      ( 02 Oct 2019 05:30 )             48.6     02 Oct 2019 05:30    140    |  106    |  12     ----------------------------<  113    3.6     |  26     |  0.70     Ca    9.0        02 Oct 2019 05:30  Mg     2.0       02 Oct 2019 05:30    TPro  7.7    /  Alb  3.9    /  TBili  0.6    /  DBili  x      /  AST  21     /  ALT  45     /  AlkPhos  70     01 Oct 2019 20:49    PT/INR - ( 01 Oct 2019 20:49 )   PT: 12.2 sec;   INR: 1.12 ratio         PTT - ( 01 Oct 2019 20:49 )  PTT:26.9 sec    CAPILLARY BLOOD GLUCOSE          RADIOLOGY & ADDITIONAL TESTS:    Imaging Personally Reviewed:  [ ] YES     Consultant(s) Notes Reviewed:      Care Discussed with Consultants/Other Providers:    Advanced Directives: [ ] DNR  [ ] No feeding tube  [ ] MOLST in chart  [ ] MOLST completed today  [ ] Unknown

## 2019-10-02 NOTE — CONSULT NOTE ADULT - SUBJECTIVE AND OBJECTIVE BOX
HPI:  This is a 62 y/o M with PMH of HTN, Dyslipidemia, CAD s/p PCI, PAD s/p CEA, TIA, and GERD, who presented with elevated BP. He was checking his BP at home when he realized he has "very High BP", 180/100, and was feeling tightness in his belly, so came to the ED to be checked. and was found to have rapid PAF. Denies chest pain, shortness of breath at rest, dyspnea on exertion, orthopnea, paroxysmal nocturnal dyspnea, dizziness, lightheadedness, claudication, pre-syncope or syncope.    PAST MEDICAL & SURGICAL HISTORY:  TIA (transient ischemic attack)  Carotid stenosis  CAD in native artery  Hyperlipemia  Gastric reflux  Hypertension  S/P angioplasty with stent  H/O carotid endarterectomy  ED    SOCIAL HISTORY: Non-Smoker/Social ETOH/ No Ilicit Drug use.    FAMILY HISTORY:  No pertinent family history in first degree relatives    Allergies  No Known Allergies    Home Medications:  aspirin 81 mg oral delayed release tablet: 1 tab(s) orally once a day (01 Oct 2019 20:13)  atenolol 50 mg oral tablet: 1 tab(s) orally once a day (01 Oct 2019 22:32)  rosuvastatin 10 mg oral tablet: 1 tab(s) orally once a day (at bedtime) (01 Oct 2019 20:13)  Ramapril 5 mg QD  Cialis 5 MG QD    HOSPITAL MEDICATIONS:   MEDICATIONS  (STANDING):  amLODIPine   Tablet 5 milliGRAM(s) Oral daily  apixaban 5 milliGRAM(s) Oral every 12 hours  aspirin enteric coated 81 milliGRAM(s) Oral daily  ATENolol  Tablet 50 milliGRAM(s) Oral at bedtime  atorvastatin 40 milliGRAM(s) Oral at bedtime      REVIEW OF SYSTEMS: 13 systems were reviewed and all negative except for comments above.    Vital Signs Last 24 Hrs  T(C): 36.6 (02 Oct 2019 16:50), Max: 36.8 (01 Oct 2019 20:06)  T(F): 97.9 (02 Oct 2019 16:50), Max: 98.3 (02 Oct 2019 08:05)  HR: 54 (02 Oct 2019 16:50) (54 - 144)  BP: 165/97 (02 Oct 2019 16:50) (112/69 - 193/108)  BP(mean): --  RR: 20 (02 Oct 2019 16:50) (14 - 20)  SpO2: 95% (02 Oct 2019 16:50) (94% - 100%)Daily Height in cm: 177.8 (02 Oct 2019 08:05)    Daily I&O's Summary    01 Oct 2019 07:01  -  02 Oct 2019 07:00  --------------------------------------------------------  IN: 0 mL / OUT: 1000 mL / NET: -1000 mL        PHYSICAL EXAM:  Constitutional: NAD, awake and alert, well-developed  HEENT: PERRLA, EOMI,  No oral cyanosis. Oropharynx Clean and Dry.  Neck:  supple,  No JVD, No Thyroid enlargement. No Carotid Bruits bilaterally.  Respiratory: Breath sounds are clear bilaterally, No wheezing, rales or rhonchi  Cardiovascular: NL S1 and S2, IR, IR, 1/6 JYOTSNA to LLSB. No S3,  No S4.   GI: Bowel Sounds present, soft   Extremities: No peripheral edema. No clubbing or cyanosis.  Vascular: 1+ peripheral pulses in LE   Neurological: A/O x 3, no gross focal motor deficits  Musculoskeletal: no calf tenderness or joint swelling.  Skin: No rashes or lessions.      LABS: All Labs Reviewed:                        16.5   6.50  )-----------( 139      ( 02 Oct 2019 05:30 )             48.6                         16.4   6.66  )-----------( 139      ( 01 Oct 2019 20:49 )             46.9     02 Oct 2019 05:30    140    |  106    |  12     ----------------------------<  113    3.6     |  26     |  0.70   01 Oct 2019 20:49    141    |  105    |  14     ----------------------------<  102    3.5     |  26     |  0.79     Ca    9.0        02 Oct 2019 05:30  Ca    9.1        01 Oct 2019 20:49  Mg     2.0       02 Oct 2019 05:30    TPro  7.7    /  Alb  3.9    /  TBili  0.6    /  DBili  x      /  AST  21     /  ALT  45     /  AlkPhos  70     01 Oct 2019 20:49    PT/INR - ( 01 Oct 2019 20:49 )   PT: 12.2 sec;   INR: 1.12 ratio         PTT - ( 01 Oct 2019 20:49 )  PTT:26.9 sec  CARDIAC MARKERS ( 02 Oct 2019 02:37 )  .055 ng/mL / x     / x     / x     / x      CARDIAC MARKERS ( 01 Oct 2019 20:49 )  .065 ng/mL / x     / x     / x     / x          10-01 @ 20:49  Pro Bnp [id number]        RADIOLOGY:    EKG:    ECHO:    CARDIAC CATHTERIZATION/STRESS TEST: HPI:  This is a 62 y/o M with PMH of HTN, Dyslipidemia, CAD s/p PCI, PAD s/p CEA, TIA, and GERD, who presented with elevated BP. He was checking his BP at home when he realized he has "very High BP", 180/100, and was feeling tightness in his belly, so came to the ED to be checked. and was found to have rapid PAF. Denies chest pain, shortness of breath at rest, dyspnea on exertion, orthopnea, paroxysmal nocturnal dyspnea, dizziness, lightheadedness, claudication, pre-syncope or syncope.    PAST MEDICAL & SURGICAL HISTORY:  TIA (transient ischemic attack)  Carotid stenosis  CAD in native artery  Hyperlipemia  Gastric reflux  Hypertension  S/P angioplasty with stent  H/O carotid endarterectomy  ED    SOCIAL HISTORY: Non-Smoker/Social ETOH/ No Ilicit Drug use.    FAMILY HISTORY:  No pertinent family history in first degree relatives    Allergies  No Known Allergies    Home Medications:  aspirin 81 mg oral delayed release tablet: 1 tab(s) orally once a day (01 Oct 2019 20:13)  atenolol 50 mg oral tablet: 1 tab(s) orally once a day (01 Oct 2019 22:32)  rosuvastatin 10 mg oral tablet: 1 tab(s) orally once a day (at bedtime) (01 Oct 2019 20:13)  Ramapril 5 mg QD  Cialis 5 MG QD    HOSPITAL MEDICATIONS:   MEDICATIONS  (STANDING):  amLODIPine   Tablet 5 milliGRAM(s) Oral daily  apixaban 5 milliGRAM(s) Oral every 12 hours  aspirin enteric coated 81 milliGRAM(s) Oral daily  ATENolol  Tablet 50 milliGRAM(s) Oral at bedtime  atorvastatin 40 milliGRAM(s) Oral at bedtime      REVIEW OF SYSTEMS: 13 systems were reviewed and all negative except for comments above.    Vital Signs Last 24 Hrs  T(C): 36.6 (02 Oct 2019 16:50), Max: 36.8 (01 Oct 2019 20:06)  T(F): 97.9 (02 Oct 2019 16:50), Max: 98.3 (02 Oct 2019 08:05)  HR: 54 (02 Oct 2019 16:50) (54 - 144)  BP: 165/97 (02 Oct 2019 16:50) (112/69 - 193/108)  BP(mean): --  RR: 20 (02 Oct 2019 16:50) (14 - 20)  SpO2: 95% (02 Oct 2019 16:50) (94% - 100%)Daily Height in cm: 177.8 (02 Oct 2019 08:05)    Daily I&O's Summary    01 Oct 2019 07:01  -  02 Oct 2019 07:00  --------------------------------------------------------  IN: 0 mL / OUT: 1000 mL / NET: -1000 mL        PHYSICAL EXAM:  Constitutional: NAD, awake and alert, well-developed  HEENT: PERRLA, EOMI,  No oral cyanosis. Oropharynx Clean and Dry.  Neck:  supple,  No JVD, No Thyroid enlargement. No Carotid Bruits bilaterally.  Respiratory: Breath sounds are clear bilaterally, No wheezing, rales or rhonchi  Cardiovascular: NL S1 and S2, IR, IR, 1/6 JYOTSNA to LLSB. No S3,  No S4.   GI: Bowel Sounds present, soft   Extremities: No peripheral edema. No clubbing or cyanosis.  Vascular: 1+ peripheral pulses in LE   Neurological: A/O x 3, no gross focal motor deficits  Musculoskeletal: no calf tenderness or joint swelling.  Skin: No rashes.      LABS: All Labs Reviewed:                        16.5   6.50  )-----------( 139      ( 02 Oct 2019 05:30 )             48.6                         16.4   6.66  )-----------( 139      ( 01 Oct 2019 20:49 )             46.9     02 Oct 2019 05:30    140    |  106    |  12     ----------------------------<  113    3.6     |  26     |  0.70   01 Oct 2019 20:49    141    |  105    |  14     ----------------------------<  102    3.5     |  26     |  0.79     Ca    9.0        02 Oct 2019 05:30  Ca    9.1        01 Oct 2019 20:49  Mg     2.0       02 Oct 2019 05:30    TPro  7.7    /  Alb  3.9    /  TBili  0.6    /  DBili  x      /  AST  21     /  ALT  45     /  AlkPhos  70     01 Oct 2019 20:49    PT/INR - ( 01 Oct 2019 20:49 )   PT: 12.2 sec;   INR: 1.12 ratio         PTT - ( 01 Oct 2019 20:49 )  PTT:26.9 sec  CARDIAC MARKERS ( 02 Oct 2019 02:37 )  .055 ng/mL / x     / x     / x     / x      CARDIAC MARKERS ( 01 Oct 2019 20:49 )  .065 ng/mL / x     / x     / x     / x        10-01 @ 20:49  Pro Bnp 1315    RADIOLOGY:  < from: Xray Chest 2 Views PA/Lat (10.01.19 @ 22:04) >    IMPRESSION: Clear lungs, unchanged.    < end of copied text >    EKG:  Afib with RVR, inferolateral NS st chnages.  ECHO:  7/2019: NL LV FX, Mod. LVH, 1-2 + MR, LAE, Mild TR  Stress test 7/2019:  Inferior MI with minimal caren infarct ischemia.

## 2019-10-02 NOTE — PROGRESS NOTE ADULT - ATTENDING COMMENTS
Management plan was discussed with patient at the bedside, he understands & agrees. I spoke with Dr. Christianson who is the patient's PMD.  He will take over his care tomorrow.

## 2019-10-02 NOTE — ED ADULT NURSE NOTE - OBJECTIVE STATEMENT
62yo male walked into ED, pt c/o "my doctor said to come in when my blood pressure is high" as per pt

## 2019-10-03 ENCOUNTER — TRANSCRIPTION ENCOUNTER (OUTPATIENT)
Age: 64
End: 2019-10-03

## 2019-10-03 VITALS
HEART RATE: 73 BPM | TEMPERATURE: 98 F | OXYGEN SATURATION: 95 % | DIASTOLIC BLOOD PRESSURE: 88 MMHG | RESPIRATION RATE: 17 BRPM | SYSTOLIC BLOOD PRESSURE: 145 MMHG

## 2019-10-03 DIAGNOSIS — I10 ESSENTIAL (PRIMARY) HYPERTENSION: ICD-10-CM

## 2019-10-03 DIAGNOSIS — I25.10 ATHEROSCLEROTIC HEART DISEASE OF NATIVE CORONARY ARTERY WITHOUT ANGINA PECTORIS: ICD-10-CM

## 2019-10-03 DIAGNOSIS — G45.9 TRANSIENT CEREBRAL ISCHEMIC ATTACK, UNSPECIFIED: ICD-10-CM

## 2019-10-03 LAB
ALBUMIN SERPL ELPH-MCNC: 3.6 G/DL — SIGNIFICANT CHANGE UP (ref 3.3–5)
ALP SERPL-CCNC: 65 U/L — SIGNIFICANT CHANGE UP (ref 30–120)
ALT FLD-CCNC: 40 U/L DA — SIGNIFICANT CHANGE UP (ref 10–60)
ANION GAP SERPL CALC-SCNC: 5 MMOL/L — SIGNIFICANT CHANGE UP (ref 5–17)
AST SERPL-CCNC: 17 U/L — SIGNIFICANT CHANGE UP (ref 10–40)
BILIRUB SERPL-MCNC: 0.9 MG/DL — SIGNIFICANT CHANGE UP (ref 0.2–1.2)
BUN SERPL-MCNC: 17 MG/DL — SIGNIFICANT CHANGE UP (ref 7–23)
CALCIUM SERPL-MCNC: 9.3 MG/DL — SIGNIFICANT CHANGE UP (ref 8.4–10.5)
CHLORIDE SERPL-SCNC: 105 MMOL/L — SIGNIFICANT CHANGE UP (ref 96–108)
CO2 SERPL-SCNC: 32 MMOL/L — HIGH (ref 22–31)
CREAT SERPL-MCNC: 0.91 MG/DL — SIGNIFICANT CHANGE UP (ref 0.5–1.3)
GLUCOSE SERPL-MCNC: 109 MG/DL — HIGH (ref 70–99)
HCT VFR BLD CALC: 48.4 % — SIGNIFICANT CHANGE UP (ref 39–50)
HGB BLD-MCNC: 16.3 G/DL — SIGNIFICANT CHANGE UP (ref 13–17)
MCHC RBC-ENTMCNC: 32.1 PG — SIGNIFICANT CHANGE UP (ref 27–34)
MCHC RBC-ENTMCNC: 33.7 GM/DL — SIGNIFICANT CHANGE UP (ref 32–36)
MCV RBC AUTO: 95.5 FL — SIGNIFICANT CHANGE UP (ref 80–100)
NRBC # BLD: 0 /100 WBCS — SIGNIFICANT CHANGE UP (ref 0–0)
PLATELET # BLD AUTO: 142 K/UL — LOW (ref 150–400)
POTASSIUM SERPL-MCNC: 4.2 MMOL/L — SIGNIFICANT CHANGE UP (ref 3.5–5.3)
POTASSIUM SERPL-SCNC: 4.2 MMOL/L — SIGNIFICANT CHANGE UP (ref 3.5–5.3)
PROT SERPL-MCNC: 7.3 G/DL — SIGNIFICANT CHANGE UP (ref 6–8.3)
RBC # BLD: 5.07 M/UL — SIGNIFICANT CHANGE UP (ref 4.2–5.8)
RBC # FLD: 12.3 % — SIGNIFICANT CHANGE UP (ref 10.3–14.5)
SODIUM SERPL-SCNC: 142 MMOL/L — SIGNIFICANT CHANGE UP (ref 135–145)
TSH SERPL-MCNC: 2.06 UIU/ML — SIGNIFICANT CHANGE UP (ref 0.27–4.2)
TSH SERPL-MCNC: 2.9 UIU/ML — SIGNIFICANT CHANGE UP (ref 0.27–4.2)
WBC # BLD: 6.16 K/UL — SIGNIFICANT CHANGE UP (ref 3.8–10.5)
WBC # FLD AUTO: 6.16 K/UL — SIGNIFICANT CHANGE UP (ref 3.8–10.5)

## 2019-10-03 PROCEDURE — 99233 SBSQ HOSP IP/OBS HIGH 50: CPT

## 2019-10-03 PROCEDURE — 93010 ELECTROCARDIOGRAM REPORT: CPT

## 2019-10-03 RX ORDER — DILTIAZEM HCL 120 MG
60 CAPSULE, EXT RELEASE 24 HR ORAL EVERY 6 HOURS
Refills: 0 | Status: DISCONTINUED | OUTPATIENT
Start: 2019-10-03 | End: 2019-10-03

## 2019-10-03 RX ORDER — DILTIAZEM HCL 120 MG
240 CAPSULE, EXT RELEASE 24 HR ORAL
Qty: 30 | Refills: 0
Start: 2019-10-03 | End: 2019-11-01

## 2019-10-03 RX ORDER — DILTIAZEM HCL 120 MG
1 CAPSULE, EXT RELEASE 24 HR ORAL
Qty: 30 | Refills: 0
Start: 2019-10-03 | End: 2019-11-01

## 2019-10-03 RX ORDER — APIXABAN 2.5 MG/1
1 TABLET, FILM COATED ORAL
Qty: 60 | Refills: 0
Start: 2019-10-03 | End: 2019-11-01

## 2019-10-03 RX ORDER — DILTIAZEM HCL 120 MG
1 CAPSULE, EXT RELEASE 24 HR ORAL
Qty: 0 | Refills: 0 | DISCHARGE
Start: 2019-10-03

## 2019-10-03 RX ORDER — DILTIAZEM HCL 120 MG
1 CAPSULE, EXT RELEASE 24 HR ORAL
Qty: 2 | Refills: 0
Start: 2019-10-03 | End: 2019-10-03

## 2019-10-03 RX ORDER — ATENOLOL 25 MG/1
1 TABLET ORAL
Qty: 0 | Refills: 0 | DISCHARGE

## 2019-10-03 RX ORDER — ATENOLOL 25 MG/1
3 TABLET ORAL
Qty: 90 | Refills: 0
Start: 2019-10-03 | End: 2019-11-01

## 2019-10-03 RX ORDER — ASPIRIN/CALCIUM CARB/MAGNESIUM 324 MG
1 TABLET ORAL
Qty: 0 | Refills: 0 | DISCHARGE
Start: 2019-10-03

## 2019-10-03 RX ADMIN — Medication 90 MILLIGRAM(S): at 06:11

## 2019-10-03 RX ADMIN — Medication 60 MILLIGRAM(S): at 11:48

## 2019-10-03 RX ADMIN — APIXABAN 5 MILLIGRAM(S): 2.5 TABLET, FILM COATED ORAL at 11:48

## 2019-10-03 RX ADMIN — Medication 81 MILLIGRAM(S): at 11:48

## 2019-10-03 NOTE — DISCHARGE NOTE PROVIDER - HOSPITAL COURSE
64 yo M PMHx HTN, HLD, carotid stenosis, TIA, CAD s/p stent placement 2014 presents to ED c/o dry mouth. States he took his BP today, routinely, noted to be 220/110. Pt alarmed at number, and states his stomach started feeling "tight"- symptom has since resolved. Pt comfortable, denies acute complaints currently. States dry mouth is chronic. Denies chest pain, palpitations, SOB, fever/chills, abd pain, N/V. Pt admits to daily alcohol use- about 2 glasses wine/day- last drink was this afternoon at lunch    · Heart Rate Heart Rate (beats/min): 144    · BP Systolic Systolic: 157    · BP Diastolic Diastolic (mm Hg): 106    · Respiration Rate (breaths/min) Respiration Rate (breaths/min): 20    · SpO2 (%) SpO2 (%): 97    · O2 delivery Patient On: room air    · CARDIAC RHYTHM: IRREGULAR    · CARDIAC RATE: TACHYCARDIC 64 yo M PMHx HTN, HLD, carotid stenosis, TIA, CAD s/p stent placement 2014 presents to ED c/o dry mouth. States he took his BP today, routinely, noted to be 220/110. Pt alarmed at number, and states his stomach started feeling "tight"- symptom has since resolved. Pt comfortable, denies acute complaints currently. States dry mouth is chronic. Denies chest pain, palpitations, SOB, fever/chills, abd pain, N/V. Pt admits to daily alcohol use- about 2 glasses wine/day- last drink was this afternoon at lunch    · Heart Rate Heart Rate (beats/min): 144    · BP Systolic Systolic: 157    · BP Diastolic Diastolic (mm Hg): 106    · Respiration Rate (breaths/min) Respiration Rate (breaths/min): 20    · SpO2 (%) SpO2 (%): 97    · O2 delivery Patient On: room air    · CARDIAC RHYTHM: IRREGULAR    · CARDIAC RATE: TACHYCARDIC    Labile HTN [193/108] /s neurologic/ ophthalmic or additional  SSx.    EKG: A. Fib with RVR at 150/min, normal QRS voltage, duration, and axis (zero), with normal transition, nonspecific ST-T abnormality.        · Assessment    62 y/o M with PMH of HTN, Dyslipidemia, CAD s/p PCI, PAD s/p CEA, TIA, and GERD, presented with elevated BP, was found to have A. Fib with RVR.         Admitted to telemetry floor, cardiac consult, HR contro, HBP managed, started on A/C, review of prior studies TTE/ rhythm, electrolytes, TSH, H/H monitoring.    Once converted back to RSR, & BP controlled, pt asymptomatic /s sequels, d/c home. To f/u outpt PMD & Cardio, avoid ETOH, caffeine, smoking, fatigue, DH, exhaustion, sleep deprivation, decongestant & other Rx's /s prior MD authorization. _Possible need for Loop event recorder.

## 2019-10-03 NOTE — PROGRESS NOTE ADULT - PROBLEM SELECTOR PLAN 1
Resolved back to [facility], on drug tx + A/C.
converted to sinus rhythm change cardizem to 240 mg po daily , atenolol 50 mg po daily , discontinue ramipril , continue eliquis , ecotrin , follow up with dr wray  minimize alcohol intake
Spoke with Dr. Potts over the phone, recommended to start elliquis 5mg BID.  Continue Asa 81mg daily.  His Partner Dr. Sheridan still see pt in the afternoon.

## 2019-10-03 NOTE — DISCHARGE NOTE PROVIDER - PROVIDER TOKENS
PROVIDER:[TOKEN:[[id number]],FOLLOWUP:[1 week]],PROVIDER:[TOKEN:[[id number]],FOLLOWUP:[2 weeks]]

## 2019-10-03 NOTE — PROGRESS NOTE ADULT - SUBJECTIVE AND OBJECTIVE BOX
INTERVAL HPI/ OVERNIGHT EVENTS: Seen by cardiologist, cleared for d/c home, back to RSR, on Eliquis, Metoprolol, & ASA, asked to limit ETOH.    HEALTH ISSUES - PROBLEM Dx:  ASHD (arteriosclerotic heart disease): ASHD (arteriosclerotic heart disease)  HTN (hypertension): HTN (hypertension)  Paroxysmal atrial fibrillation with RVR: Paroxysmal atrial fibrillation with RVR  Elevated BP without diagnosis of hypertension: Elevated BP without diagnosis of hypertension  Need for prophylactic measure: Need for prophylactic measure  Thrombocytopenia: Thrombocytopenia  Troponin level elevated: Troponin level elevated  New onset atrial fibrillation: New onset atrial fibrillation        PAST MEDICAL & SURGICAL HISTORY:  TIA (transient ischemic attack)  Carotid stenosis  CAD in native artery  Hyperlipemia  Gastric reflux  Hypertension  S/P angioplasty with stent  H/O carotid endarterectomy          VITAL SIGNS:  Vital Signs Last 24 Hrs  T(C): 36.6 (03 Oct 2019 09:23), Max: 36.7 (02 Oct 2019 12:58)  T(F): 97.8 (03 Oct 2019 09:23), Max: 98.1 (02 Oct 2019 14:30)  HR: 72 (03 Oct 2019 11:47) (54 - 84)  BP: 136/76 (03 Oct 2019 11:47) (109/69 - 193/108)  BP(mean): --  RR: 18 (03 Oct 2019 09:23) (17 - 20)  SpO2: 95% (03 Oct 2019 09:23) (94% - 97%)  PHYSICAL EXAM:    Constitutional: NAD, awake and alert, well-developed  HEENT: PERRLA, EOMI,  No oral cyanosis. Oropharynx Clean and Dry.  Neck:  supple,  No JVD, No Thyroid enlargement. No Carotid Bruits bilaterally.  Respiratory: Breath sounds are clear bilaterally, No wheezing, rales or rhonchi  Cardiovascular: NL S1 and S2, IR, IR, 1/6 JYOTSNA to LLSB. No S3,  No S4.   GI: Bowel Sounds present, soft   Extremities: No peripheral edema. No clubbing or cyanosis.  Vascular: 1+ peripheral pulses in LE   Neurological: A/O x 3, no gross focal motor deficits  Musculoskeletal: no calf tenderness or joint swelling.  Skin: No rashes.      MEDICATIONS  (STANDING):  apixaban 5 milliGRAM(s) Oral every 12 hours  aspirin enteric coated 81 milliGRAM(s) Oral daily  ATENolol  Tablet 75 milliGRAM(s) Oral at bedtime  atorvastatin 40 milliGRAM(s) Oral at bedtime  diltiazem    Tablet 60 milliGRAM(s) Oral every 6 hours    MEDICATIONS  (PRN):      Allergies    No Known Allergies    Intolerances        CAPILLARY BLOOD GLUCOSE      LABS:                        16.3   6.16  )-----------( 142      ( 03 Oct 2019 08:11 )             48.4     10-03    142  |  105  |  17  ----------------------------<  109<H>  4.2   |  32<H>  |  0.91    Ca    9.3      03 Oct 2019 08:11  Mg     2.0     10-02    TPro  7.3  /  Alb  3.6  /  TBili  0.9  /  DBili  x   /  AST  17  /  ALT  40  /  AlkPhos  65  10-03    PT/INR - ( 01 Oct 2019 20:49 )   PT: 12.2 sec;   INR: 1.12 ratio         PTT - ( 01 Oct 2019 20:49 )  PTT:26.9 sec    CARDIAC MARKERS ( 02 Oct 2019 02:37 )  .055 ng/mL / x     / x     / x     / x      CARDIAC MARKERS ( 01 Oct 2019 20:49 )  .065 ng/mL / x     / x     / x     / x              RADIOLOGY & ADDITIONAL TESTS:  < from: Xray Chest 2 Views PA/Lat (10.01.19 @ 22:04) >    Findings: The lungs are clear. The cardiomediastinal silhouette is   normal. There are mild multilevel degenerative changes of the thoracic   spine.    IMPRESSION: Clear lungs, unchanged.    < from: 12 Lead ECG (10.03.19 @ 07:50) >  Diagnosis Line Sinus bradycardia  Otherwise normal ECG  When compared with ECG of 01-OCT-2019 20:22, (Unconfirmed)  Sinus rhythm has replaced Atrial fibrillation  Vent. rate has decreased BY  88 BPM    Cardiology evaluation:  EKG:  Afib with RVR, inferolateral NS st chnages.  ECHO:  7/2019: NL LV FX, Mod. LVH, 1-2 + MR, LAE, Mild TR  Stress test 7/2019:  Inferior MI with minimal caren infarct ischemia.        echo  EF 45-50 %  in setting of afib  TDS    telemetry - monitor sinus rhythm nocturnal sinus bradycardia

## 2019-10-03 NOTE — PROGRESS NOTE ADULT - SUBJECTIVE AND OBJECTIVE BOX
HPI:  This is a 64 y/o M with PMH of HTN, Dyslipidemia, CAD s/p PCI, PAD s/p CEA, TIA, and GERD, who presented with elevated BP. He was checking his BP at home when he realized he has "very High BP", 180/100, and was feeling tightness in his belly, so came to the ED to be checked. and was found to have rapid PAF. Denies chest pain, shortness of breath at rest, dyspnea on exertion, orthopnea, paroxysmal nocturnal dyspnea, dizziness, lightheadedness, claudication, pre-syncope or syncope.    10/3/19 Patient is feeling fine , his heart rhythm converted to sinus last  night at 7:15 pm , remain in sinus since then , bp improved  low normal range now     PAST MEDICAL & SURGICAL HISTORY:  TIA (transient ischemic attack)  Carotid stenosis  CAD in native artery  Hyperlipemia  Gastric reflux  Hypertension  S/P angioplasty with stent  H/O carotid endarterectomy  ED    SOCIAL HISTORY: Non-Smoker/Social ETOH/ No Ilicit Drug use.    FAMILY HISTORY:  No pertinent family history in first degree relatives    Allergies  No Known Allergies    Home Medications:  aspirin 81 mg oral delayed release tablet: 1 tab(s) orally once a day (01 Oct 2019 20:13)  atenolol 50 mg oral tablet: 1 tab(s) orally once a day (01 Oct 2019 22:32)  rosuvastatin 10 mg oral tablet: 1 tab(s) orally once a day (at bedtime) (01 Oct 2019 20:13)  Ramapril 5 mg QD  Cialis 5 MG QD    MEDICATIONS  (STANDING):  apixaban 5 milliGRAM(s) Oral every 12 hours  aspirin enteric coated 81 milliGRAM(s) Oral daily  ATENolol  Tablet 75 milliGRAM(s) Oral at bedtime  atorvastatin 40 milliGRAM(s) Oral at bedtime  diltiazem    Tablet 90 milliGRAM(s) Oral every 8 hours    MEDICATIONS  (PRN):        REVIEW OF SYSTEMS: 13 systems were reviewed and all negative except for comments above.    Vital Signs Last 24 Hrs  T(C): 36.6 (03 Oct 2019 05:09), Max: 36.7 (02 Oct 2019 12:58)  T(F): 97.8 (03 Oct 2019 05:09), Max: 98.1 (02 Oct 2019 14:30)  HR: 62 (03 Oct 2019 05:09) (54 - 84)  BP: 109/69 (03 Oct 2019 05:09) (109/69 - 193/108)  BP(mean): --  RR: 18 (03 Oct 2019 05:09) (17 - 20)  SpO2: 95% (03 Oct 2019 05:09) (94% - 97%)    I&O's Summary        PHYSICAL EXAM:  Constitutional: NAD, awake and alert, well-developed  HEENT: PERRLA, EOMI,  No oral cyanosis. Oropharynx Clean and Dry.  Neck:  supple,  No JVD, No Thyroid enlargement. No Carotid Bruits bilaterally.  Respiratory: Breath sounds are clear bilaterally, No wheezing, rales or rhonchi  Cardiovascular: NL S1 and S2, IR, IR, 1/6 JYOTSNA to LLSB. No S3,  No S4.   GI: Bowel Sounds present, soft   Extremities: No peripheral edema. No clubbing or cyanosis.  Vascular: 1+ peripheral pulses in LE   Neurological: A/O x 3, no gross focal motor deficits  Musculoskeletal: no calf tenderness or joint swelling.  Skin: No rashes.      LABS: All Labs Reviewed:                                     16.3   6.16  )-----------( 142      ( 03 Oct 2019 08:11 )             48.4     10-02    140  |  106  |  12  ----------------------------<  113<H>  3.6   |  26  |  0.70    Ca    9.0      02 Oct 2019 05:30  Mg     2.0     10-02    TPro  7.7  /  Alb  3.9  /  TBili  0.6  /  DBili  x   /  AST  21  /  ALT  45  /  AlkPhos  70  10-01    CARDIAC MARKERS ( 02 Oct 2019 02:37 )  .055 ng/mL / x     / x     / x     / x      CARDIAC MARKERS ( 01 Oct 2019 20:49 )  .065 ng/mL / x     / x     / x     / x          LIVER FUNCTIONS - ( 01 Oct 2019 20:49 )  Alb: 3.9 g/dL / Pro: 7.7 g/dL / ALK PHOS: 70 U/L / ALT: 45 U/L DA / AST: 21 U/L / GGT: x           PT/INR - ( 01 Oct 2019 20:49 )   PT: 12.2 sec;   INR: 1.12 ratio         PTT - ( 01 Oct 2019 20:49 )  PTT:26.9 sec      RADIOLOGY:  < from: Xray Chest 2 Views PA/Lat (10.01.19 @ 22:04) >    IMPRESSION: Clear lungs, unchanged.    < end of copied text >    EKG:  Afib with RVR, inferolateral NS st chnages.  ECHO:  7/2019: NL LV FX, Mod. LVH, 1-2 + MR, LAE, Mild TR  Stress test 7/2019:  Inferior MI with minimal caren infarct ischemia.        echo  EF 45-50 %  in setting of afib  TDS    monitor sinus rhythm nocturnal sinus bradycardia

## 2019-10-03 NOTE — DISCHARGE NOTE PROVIDER - NSDCCPTREATMENT_GEN_ALL_CORE_FT
PRINCIPAL PROCEDURE  Procedure: Cardiac telemetry with pulse oximetry  Findings and Treatment:       SECONDARY PROCEDURE  Procedure: Cardiac enzymes  Findings and Treatment:

## 2019-10-03 NOTE — DISCHARGE NOTE NURSING/CASE MANAGEMENT/SOCIAL WORK - PATIENT PORTAL LINK FT
You can access the FollowMyHealth Patient Portal offered by [facility] by registering at the following website: [email or web address]. By joining [facility] [facility] [employer] [facility]’s FollowMyHealth portal, you will also be able to view your health information using other applications (apps) compatible with our system.

## 2019-10-03 NOTE — DISCHARGE NOTE PROVIDER - CARE PROVIDER_API CALL
Sean Christianson)  Family Practice Medicine  [address] [place] [id number]  Phone: [phone]  Fax: [phone]  Follow Up Time: 1 week    Elpidio Potts)  Cardiovascular Disease  [address] [place] [address]  Phone: [phone]  Fax: [phone]  Follow Up Time: 2 weeks

## 2019-10-03 NOTE — DISCHARGE NOTE PROVIDER - NSDCACTIVITY_GEN_ALL_CORE
Stairs allowed/Walking - Indoors allowed/Showering allowed/Bathing allowed/No heavy lifting/straining/Driving allowed/Walking - Outdoors allowed

## 2019-10-03 NOTE — DISCHARGE NOTE PROVIDER - NSDCCPCAREPLAN_GEN_ALL_CORE_FT
PRINCIPAL DISCHARGE DIAGNOSIS  Diagnosis: Paroxysmal atrial fibrillation with RVR  Assessment and Plan of Treatment:

## 2019-10-03 NOTE — PROGRESS NOTE ADULT - PROBLEM SELECTOR PLAN 3
Old MI, minimal elevated troponin in setting afib with rapid rate and uncontrolled hypertension with underlying CAD  patient had recent stress test    his EF mild decreased in setting of afib with TDS , will follow up in office ,
Statin/ ASA, control risk Fx.
trended down. Will repeat if pt becomes symptomatic

## 2019-10-03 NOTE — PROGRESS NOTE ADULT - PROBLEM SELECTOR PROBLEM 1
Paroxysmal atrial fibrillation with RVR
Paroxysmal atrial fibrillation with RVR
New onset atrial fibrillation

## 2019-10-03 NOTE — PROGRESS NOTE ADULT - ASSESSMENT
Cardiology   Assessment and Plan:   Problem/Plan - 1:  ·  Problem: Paroxysmal atrial fibrillation with RVR.  Plan: converted to sinus rhythm change cardizem to 240 mg po daily , atenolol 50 mg po daily , discontinue ramipril , continue eliquis , ecotrin , follow up with dr wray  minimize alcohol intake.     Problem/Plan - 2:  ·  Problem: HTN (hypertension).  Plan: improved now low normal range.     Problem/Plan - 3:  ·  Problem: ASHD (arteriosclerotic heart disease).  Plan: Old MI, minimal elevated troponin in setting afib with rapid rate and uncontrolled hypertension with underlying CAD  patient had recent stress test    his EF mild decreased in setting of afib with TDS , will follow up in office ,
62 y/o M with PMH of HTN, Dyslipidemia, CAD s/p PCI, PAD s/p CEA, TIA, and GERD, presented with elevated BP, was found to have A. Fib with RVR.

## 2019-10-13 PROCEDURE — 71046 X-RAY EXAM CHEST 2 VIEWS: CPT

## 2019-10-13 PROCEDURE — 85610 PROTHROMBIN TIME: CPT

## 2019-10-13 PROCEDURE — 86803 HEPATITIS C AB TEST: CPT

## 2019-10-13 PROCEDURE — 80053 COMPREHEN METABOLIC PANEL: CPT

## 2019-10-13 PROCEDURE — 85730 THROMBOPLASTIN TIME PARTIAL: CPT

## 2019-10-13 PROCEDURE — 85027 COMPLETE CBC AUTOMATED: CPT

## 2019-10-13 PROCEDURE — 84443 ASSAY THYROID STIM HORMONE: CPT

## 2019-10-13 PROCEDURE — 80307 DRUG TEST PRSMV CHEM ANLYZR: CPT

## 2019-10-13 PROCEDURE — 80048 BASIC METABOLIC PNL TOTAL CA: CPT

## 2019-10-13 PROCEDURE — 99285 EMERGENCY DEPT VISIT HI MDM: CPT | Mod: 25

## 2019-10-13 PROCEDURE — 93306 TTE W/DOPPLER COMPLETE: CPT

## 2019-10-13 PROCEDURE — 96374 THER/PROPH/DIAG INJ IV PUSH: CPT

## 2019-10-13 PROCEDURE — 83880 ASSAY OF NATRIURETIC PEPTIDE: CPT

## 2019-10-13 PROCEDURE — 84484 ASSAY OF TROPONIN QUANT: CPT

## 2019-10-13 PROCEDURE — 83735 ASSAY OF MAGNESIUM: CPT

## 2019-10-13 PROCEDURE — 36415 COLL VENOUS BLD VENIPUNCTURE: CPT

## 2019-10-13 PROCEDURE — 93005 ELECTROCARDIOGRAM TRACING: CPT

## 2019-10-21 ENCOUNTER — NON-APPOINTMENT (OUTPATIENT)
Age: 64
End: 2019-10-21

## 2019-10-21 ENCOUNTER — APPOINTMENT (OUTPATIENT)
Dept: CARDIOLOGY | Facility: CLINIC | Age: 64
End: 2019-10-21
Payer: MEDICARE

## 2019-10-21 VITALS — SYSTOLIC BLOOD PRESSURE: 156 MMHG | OXYGEN SATURATION: 97 % | HEART RATE: 55 BPM | DIASTOLIC BLOOD PRESSURE: 80 MMHG

## 2019-10-21 VITALS — WEIGHT: 192 LBS | BODY MASS INDEX: 27.49 KG/M2 | HEIGHT: 70 IN

## 2019-10-21 PROCEDURE — 99214 OFFICE O/P EST MOD 30 MIN: CPT | Mod: 25

## 2019-10-21 PROCEDURE — 93000 ELECTROCARDIOGRAM COMPLETE: CPT

## 2019-10-21 RX ORDER — APIXABAN 5 MG/1
5 TABLET, FILM COATED ORAL
Qty: 60 | Refills: 3 | Status: ACTIVE | COMMUNITY

## 2019-10-22 NOTE — DISCUSSION/SUMMARY
[FreeTextEntry1] : Afib: Continue Cardizem 240 mg daily and eliquis. Will decrease atenolol to 25mg bid.  \par Will obtain yearly Echo/carotid US \par OV in 1 month.

## 2019-10-22 NOTE — REASON FOR VISIT
[Follow-Up - Clinic] : a clinic follow-up of [Hyperlipidemia] : hyperlipidemia [Coronary Artery Disease] : coronary artery disease [Hypertension] : hypertension [Medication Management] : Medication management

## 2019-10-22 NOTE — HISTORY OF PRESENT ILLNESS
[FreeTextEntry1] : 62 Y/O gentleman PMH: CAD/RCA stent 2013, Nuclear stress/cath 2015 widely patient RCA stent, Carotid disease S/P left endarterectomy, HTN, HLD who presents to office s/p hospitalization for elevated blood\par pressure, was found to be in rapid PAF was started on Cardizem and eliquis. EKG today sinus bradycardia. Denies chest pain, shortness of breath, dyspnea on exertion, palpitations, orthopnea, paroxysmal nocturnal dyspnea, claudication, dizziness, lightheadedness, presyncopal or syncopal symptoms.

## 2020-04-06 ENCOUNTER — EMERGENCY (EMERGENCY)
Facility: HOSPITAL | Age: 65
LOS: 1 days | Discharge: HOME | End: 2020-04-06
Attending: EMERGENCY MEDICINE
Payer: COMMERCIAL

## 2020-04-06 VITALS
OXYGEN SATURATION: 96 % | DIASTOLIC BLOOD PRESSURE: 91 MMHG | HEART RATE: 69 BPM | SYSTOLIC BLOOD PRESSURE: 157 MMHG | RESPIRATION RATE: 19 BRPM | TEMPERATURE: 99 F

## 2020-04-06 VITALS
HEART RATE: 65 BPM | RESPIRATION RATE: 16 BRPM | DIASTOLIC BLOOD PRESSURE: 76 MMHG | TEMPERATURE: 99 F | WEIGHT: 197.09 LBS | OXYGEN SATURATION: 96 % | SYSTOLIC BLOOD PRESSURE: 173 MMHG | HEIGHT: 70 IN

## 2020-04-06 DIAGNOSIS — Z98.89 OTHER SPECIFIED POSTPROCEDURAL STATES: Chronic | ICD-10-CM

## 2020-04-06 LAB
ALBUMIN SERPL ELPH-MCNC: 4 G/DL — SIGNIFICANT CHANGE UP (ref 3.3–5)
ALP SERPL-CCNC: 61 U/L — SIGNIFICANT CHANGE UP (ref 40–120)
ALT FLD-CCNC: 32 U/L — SIGNIFICANT CHANGE UP (ref 10–45)
ANION GAP SERPL CALC-SCNC: 13 MMOL/L — SIGNIFICANT CHANGE UP (ref 5–17)
APTT BLD: 29.9 SEC — SIGNIFICANT CHANGE UP (ref 27.5–36.3)
AST SERPL-CCNC: 19 U/L — SIGNIFICANT CHANGE UP (ref 10–40)
BASOPHILS # BLD AUTO: 0.02 K/UL — SIGNIFICANT CHANGE UP (ref 0–0.2)
BASOPHILS NFR BLD AUTO: 0.5 % — SIGNIFICANT CHANGE UP (ref 0–2)
BILIRUB SERPL-MCNC: 0.6 MG/DL — SIGNIFICANT CHANGE UP (ref 0.2–1.2)
BUN SERPL-MCNC: 8 MG/DL — SIGNIFICANT CHANGE UP (ref 7–23)
CALCIUM SERPL-MCNC: 8.8 MG/DL — SIGNIFICANT CHANGE UP (ref 8.4–10.5)
CHLORIDE SERPL-SCNC: 102 MMOL/L — SIGNIFICANT CHANGE UP (ref 96–108)
CO2 SERPL-SCNC: 24 MMOL/L — SIGNIFICANT CHANGE UP (ref 22–31)
CREAT SERPL-MCNC: 0.62 MG/DL — SIGNIFICANT CHANGE UP (ref 0.5–1.3)
EOSINOPHIL # BLD AUTO: 0.08 K/UL — SIGNIFICANT CHANGE UP (ref 0–0.5)
EOSINOPHIL NFR BLD AUTO: 2.2 % — SIGNIFICANT CHANGE UP (ref 0–6)
GLUCOSE SERPL-MCNC: 116 MG/DL — HIGH (ref 70–99)
HCT VFR BLD CALC: 43.5 % — SIGNIFICANT CHANGE UP (ref 39–50)
HGB BLD-MCNC: 14.9 G/DL — SIGNIFICANT CHANGE UP (ref 13–17)
IMM GRANULOCYTES NFR BLD AUTO: 0.3 % — SIGNIFICANT CHANGE UP (ref 0–1.5)
INR BLD: 1.24 RATIO — HIGH (ref 0.88–1.16)
LYMPHOCYTES # BLD AUTO: 0.93 K/UL — LOW (ref 1–3.3)
LYMPHOCYTES # BLD AUTO: 25.3 % — SIGNIFICANT CHANGE UP (ref 13–44)
MCHC RBC-ENTMCNC: 31.4 PG — SIGNIFICANT CHANGE UP (ref 27–34)
MCHC RBC-ENTMCNC: 34.3 GM/DL — SIGNIFICANT CHANGE UP (ref 32–36)
MCV RBC AUTO: 91.8 FL — SIGNIFICANT CHANGE UP (ref 80–100)
MONOCYTES # BLD AUTO: 0.33 K/UL — SIGNIFICANT CHANGE UP (ref 0–0.9)
MONOCYTES NFR BLD AUTO: 9 % — SIGNIFICANT CHANGE UP (ref 2–14)
NEUTROPHILS # BLD AUTO: 2.31 K/UL — SIGNIFICANT CHANGE UP (ref 1.8–7.4)
NEUTROPHILS NFR BLD AUTO: 62.7 % — SIGNIFICANT CHANGE UP (ref 43–77)
NRBC # BLD: 0 /100 WBCS — SIGNIFICANT CHANGE UP (ref 0–0)
PLATELET # BLD AUTO: 98 K/UL — LOW (ref 150–400)
POTASSIUM SERPL-MCNC: 3.4 MMOL/L — LOW (ref 3.5–5.3)
POTASSIUM SERPL-SCNC: 3.4 MMOL/L — LOW (ref 3.5–5.3)
PROT SERPL-MCNC: 7.1 G/DL — SIGNIFICANT CHANGE UP (ref 6–8.3)
PROTHROM AB SERPL-ACNC: 14.3 SEC — HIGH (ref 10–12.9)
RBC # BLD: 4.74 M/UL — SIGNIFICANT CHANGE UP (ref 4.2–5.8)
RBC # FLD: 11.9 % — SIGNIFICANT CHANGE UP (ref 10.3–14.5)
SODIUM SERPL-SCNC: 139 MMOL/L — SIGNIFICANT CHANGE UP (ref 135–145)
WBC # BLD: 3.68 K/UL — LOW (ref 3.8–10.5)
WBC # FLD AUTO: 3.68 K/UL — LOW (ref 3.8–10.5)

## 2020-04-06 PROCEDURE — 85027 COMPLETE CBC AUTOMATED: CPT

## 2020-04-06 PROCEDURE — 85730 THROMBOPLASTIN TIME PARTIAL: CPT

## 2020-04-06 PROCEDURE — 85610 PROTHROMBIN TIME: CPT

## 2020-04-06 PROCEDURE — 93010 ELECTROCARDIOGRAM REPORT: CPT

## 2020-04-06 PROCEDURE — 80053 COMPREHEN METABOLIC PANEL: CPT

## 2020-04-06 PROCEDURE — 93005 ELECTROCARDIOGRAM TRACING: CPT

## 2020-04-06 PROCEDURE — 70498 CT ANGIOGRAPHY NECK: CPT | Mod: 26

## 2020-04-06 PROCEDURE — 70496 CT ANGIOGRAPHY HEAD: CPT | Mod: 26

## 2020-04-06 PROCEDURE — 70450 CT HEAD/BRAIN W/O DYE: CPT

## 2020-04-06 PROCEDURE — 99284 EMERGENCY DEPT VISIT MOD MDM: CPT | Mod: 25

## 2020-04-06 PROCEDURE — 70498 CT ANGIOGRAPHY NECK: CPT

## 2020-04-06 PROCEDURE — 99285 EMERGENCY DEPT VISIT HI MDM: CPT

## 2020-04-06 PROCEDURE — 70496 CT ANGIOGRAPHY HEAD: CPT

## 2020-04-06 RX ORDER — ATORVASTATIN CALCIUM 80 MG/1
40 TABLET, FILM COATED ORAL AT BEDTIME
Refills: 0 | Status: DISCONTINUED | OUTPATIENT
Start: 2020-04-06 | End: 2020-04-10

## 2020-04-06 RX ORDER — ATORVASTATIN CALCIUM 80 MG/1
1 TABLET, FILM COATED ORAL
Qty: 30 | Refills: 0
Start: 2020-04-06 | End: 2020-05-05

## 2020-04-06 RX ORDER — POTASSIUM CHLORIDE 20 MEQ
40 PACKET (EA) ORAL ONCE
Refills: 0 | Status: COMPLETED | OUTPATIENT
Start: 2020-04-06 | End: 2020-04-06

## 2020-04-06 RX ADMIN — Medication 40 MILLIEQUIVALENT(S): at 10:03

## 2020-04-06 RX ADMIN — ATORVASTATIN CALCIUM 40 MILLIGRAM(S): 80 TABLET, FILM COATED ORAL at 10:37

## 2020-04-06 NOTE — ED ADULT NURSE NOTE - OBJECTIVE STATEMENT
65 yo male with PMH HTN, A-fib, TIA presents to the ED from home c/o left sided numbness x2 days with unsteady gait. patient is AAOx3. PERRL. 3mm pupils bilaterally. moving all extremities with equal strength bilaterally. decreased sensation to left upper and left lower extremities bilaterally. speech is clear. no facial droop noted. patient lung sounds CTA bilaterally. cap refill <3sec. +2 radial pulses noted. abdomen is soft, non-tender, non-distended. skin intact. patient denies HA, cough, chest pain, fevers, chills, N/V/D, abdominal pain, back pain, dysuria, bloody stools. patient states he feels dizzy when ambulating. VSS. MD at the bedside. will continue to monitor. call bell in reach. patient aware of plan of care. see neuro flow sheet for assessments.

## 2020-04-06 NOTE — ED ADULT NURSE NOTE - NSIMPLEMENTINTERV_GEN_ALL_ED
Implemented All Fall Risk Interventions:  [place] to call system. Call bell, personal items and telephone within reach. Instruct patient to call for assistance. Room bathroom lighting operational. Non-slip footwear when patient is off stretcher. Physically safe environment: no spills, clutter or unnecessary equipment. Stretcher in lowest position, wheels locked, appropriate side rails in place. Provide visual cue, wrist band, yellow gown, etc. Monitor gait and stability. Monitor for mental status changes and reorient to person, place, and time. Review medications for side effects contributing to fall risk. Reinforce activity limits and safety measures with patient and family.

## 2020-04-06 NOTE — ED PROVIDER NOTE - PHYSICAL EXAMINATION
VITALS: reviewed  GEN: NAD, A & O x 4  HEAD/EYES: NCAT, PERRL, anicteric sclerae, no conjunctival pallor  ENT: mucus membranes moist, oropharynx WNL, trachea midline  RESP: lungs CTA with equal breath sounds bilaterally, chest wall nontender and atraumatic  CV: heart with reg rhythm S1, S2, distal pulses intact and symmetric bilaterally  ABDOMEN: normoactive bowel sounds, soft, nondistended, nontender, no palpable masses  : no CVAT  MSK: extremities atraumatic and nontender, no edema, no asymmetry.  SKIN: warm, dry, no rash, no bruising, no cyanosis. color appropriate for ethnicity  NEURO: alert, mentating appropriately, no facial asymmetry. gross motor and coordination are intact. FTN normal. Decreased sensation LUE and LLE compared to R.  PSYCH: Affect appropriate

## 2020-04-06 NOTE — ED PROVIDER NOTE - CARE PROVIDER_API CALL
Jasiel Jones (DO)  Neurology; Vascular Neurology  [address] [facility] [address]  [place] [address]  Phone: [phone]  Fax: [phone]  Follow Up Time:

## 2020-04-06 NOTE — ED PROVIDER NOTE - NSFOLLOWUPINSTRUCTIONS_ED_ALL_ED_FT
You presented to the ER with complaints of L sided numbness. Your CT head does not show signs of bleed, but does show sign of infarct secondary to clot. Continue taking Eliquis. a prescription was sent to your pharmacy, take Atorvastatin 80 mg once daily (your first dose was given in the ER today, next dose should be taken 04/07/2020). Call to schedule follow up with Dr. Jones as soon as possible.    Return to ED if you develop facial droop, difficulty speaking, drooling, weakness on one part of your body, numbness that is new, chest pain, shortness of breath.

## 2020-04-06 NOTE — ED PROVIDER NOTE - ATTENDING CONTRIBUTION TO CARE
Attending MD Reynoso:  I personally have seen and examined this patient.  Resident note reviewed and agree on plan of care and except where noted.  See HPI, PE, and MDM for details.

## 2020-04-06 NOTE — ED PROVIDER NOTE - OBJECTIVE STATEMENT
65 yo M hx paroxysmal A fib on eliquis, HTN, HLD, s/p L carotid endarterectomy, presenting with 2 days of L body numbness. Denies focal weakness, difficulty speaking, facial droop or drooling. No fevers/chills, cough, cp, nausea/vomiting or abdominal pain.
0

## 2020-04-06 NOTE — ED ADULT NURSE REASSESSMENT NOTE - NS ED NURSE REASSESS COMMENT FT1
patient is resting in bed in no acute distress. patient states he is still having left sided numbness. denies pain. waiting for neuro to see patient. patient aware of plan of care. will continue to monitor.

## 2020-04-06 NOTE — ED PROVIDER NOTE - CLINICAL SUMMARY MEDICAL DECISION MAKING FREE TEXT BOX
Attending MD Reynoso: 64M with ho TIA, afib on eliquis, h/o CEA, CAD, HTN, HLD presenting with 2 days of left sided numbness. Exam with no gross motor deficits but diminished sensation LUE and LLE. Ddx includes ischemic CVA, hemorrhagic CVA, lyte derangement. 2 day last known normal so no indication for code stroke activation. Plan for CT head, CTA head/neck, neurology consultation

## 2020-04-06 NOTE — CHART NOTE - NSCHARTNOTEFT_GEN_A_CORE
Neurology  20    Name:  SAVANNA OCAMPO; 64y (1955)    Reason for Admission:     Chief Complaint:  L. hemisensory loss.     HPI:  65 yo M hx paroxysmal A fib on eliquis, HTN, HLD, s/p L carotid endarterectomy, presenting with 2 days of L body numbness. Denies focal weakness, difficulty speaking, facial droop or drooling. No fevers/chills, cough, cp, nausea/vomiting or abdominal pain.  Neurology consulted for complaints of L. hemisensory loss.     Review of Systems:  As states in HPI.    PMHx:   TIA (transient ischemic attack)  Carotid stenosis  CAD in native artery  Hyperlipemia  Gastric reflux  Hypertension    PFHx:   No pertinent family history in first degree relatives    PSuHx:   S/P angioplasty with stent  H/O carotid endarterectomy      Medications:  MEDICATIONS  (STANDING):    MEDICATIONS  (PRN):    Vitals:  T(C): 37.1 (20 @ 08:32), Max: 37.2 (20 @ 07:55)  HR: 65 (20 @ 08:32) (65 - 65)  BP: 156/81 (20 @ 08:32) (156/81 - 173/76)  RR: 18 (20 @ 08:32) (16 - 18)  SpO2: 95% (20 @ 08:32) (95% - 96%)    Radiology:  CTH/CTA Pending.     Impression:    L. hemisensory loss likely 2/2 R. subcortical dysfunction 2/2 ischemia 2/2 likely  vs. Thromboembolism.   Subacute. LKN 48hrs ago.     Plan:  [] telemetry monitoring  [] Continue Eliquis. Will consider changing AC agent in outpatient f/u.  [] Atorvastatin 80 qD  [] DVT ppx    Imaging   [] obtain TTE with bubble study (can be performed outpatient)  [] MRI head non contrast (can be performed outpatient)    Labs   [] CBC, BMP  [] Lipid panel  [] HbA1C    Other  [] dysphagia screen    Follow-up  - If imaging negative or shows subacute infarct, then outpatient w/u appropriate.   - f/u w/ Dr. Jones, Vascular Neurology, within 1-2 days of discharge.     - [address]; [place] [address] ([phone]) Neurology  20    Name:  SAVANNA OCAMPO; 64y (1955)    Reason for Admission:     Chief Complaint:  L. hemisensory loss.     HPI:  63 yo M hx paroxysmal A fib on eliquis, HTN, HLD, s/p L carotid endarterectomy, presenting with 2 days of L body numbness. Denies focal weakness, difficulty speaking, facial droop or drooling. No fevers/chills, cough, cp, nausea/vomiting or abdominal pain.  Neurology consulted for complaints of L. hemisensory loss.     Review of Systems:  As states in HPI.    PMHx:   TIA (transient ischemic attack)  Carotid stenosis  CAD in native artery  Hyperlipemia  Gastric reflux  Hypertension    PFHx:   No pertinent family history in first degree relatives    PSuHx:   S/P angioplasty with stent  H/O carotid endarterectomy      Medications:  MEDICATIONS  (STANDING):    MEDICATIONS  (PRN):    Vitals:  T(C): 37.1 (20 @ 08:32), Max: 37.2 (20 @ 07:55)  HR: 65 (20 @ 08:32) (65 - 65)  BP: 156/81 (20 @ 08:32) (156/81 - 173/76)  RR: 18 (20 @ 08:32) (16 - 18)  SpO2: 95% (20 @ 08:32) (95% - 96%)    Radiology:  CTH/CTA Pending.     Impression:    L. hemisensory loss likely 2/2 R. subcortical dysfunction 2/2 ischemia 2/2 likely  vs. Thromboembolism.   Subacute. LKN 48hrs ago.     Plan:  [] Continue Eliquis. Will consider changing AC agent in outpatient f/u.  [] Atorvastatin 80 qD  [] DVT ppx    Imaging   [] obtain TTE with bubble study (can be performed outpatient)  [] MRI head non contrast (can be performed outpatient)    Labs   [] CBC, BMP  [] Lipid panel  [] HbA1C    Other  [] dysphagia screen    Follow-up  - If imaging negative or shows subacute infarct, then outpatient w/u appropriate.   - f/u w/ Dr. Jones, Vascular Neurology, within 1-2 days of discharge.     - [address]; [place] [address] ([phone])

## 2020-04-06 NOTE — ED PROVIDER NOTE - PROGRESS NOTE DETAILS
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 DC, likely can be seen in office tomorrow.

## 2020-04-06 NOTE — CONSULT NOTE ADULT - SUBJECTIVE AND OBJECTIVE BOX
Neurology  Consult Note  04-06-20    Name:  SAVANNA OCAMPO; 64y (1955)    Reason for Admission:     Chief Complaint:  L. hemisensory loss.     HPI:  65 yo M hx paroxysmal A fib on eliquis, HTN, HLD, s/p L carotid endarterectomy, presenting with 2 days of L body numbness. Denies focal weakness, difficulty speaking, facial droop or drooling. No fevers/chills, cough, cp, nausea/vomiting or abdominal pain.  Neurology consulted for complaints of L. hemisensory loss.       Review of Systems:  As states in HPI.    PMHx:   TIA (transient ischemic attack)  Carotid stenosis  CAD in native artery  Hyperlipemia  Gastric reflux  Hypertension    PFHx:   No pertinent family history in first degree relatives    PSuHx:   S/P angioplasty with stent  H/O carotid endarterectomy    PSoHx:     Marital Status:  (   )    (   ) Single    (   )    (  )   Occupation:   Lives with: (  ) alone  (  ) children   (  ) spouse   (  ) parents  (  ) other  Recent Travel:     Substance Use (street drugs): (  ) never used  (  ) other:  Tobacco Usage:  (   ) never smoked   (   ) former smoker   (   ) current smoker  (     ) pack year  Alcohol Usage:  Sexual History:     Medications:  MEDICATIONS  (STANDING):  atorvastatin 40 milliGRAM(s) Oral at bedtime    MEDICATIONS  (PRN):    Vitals:  T(C): 37.1 (04-06-20 @ 08:32), Max: 37.2 (04-06-20 @ 07:55)  HR: 65 (04-06-20 @ 08:32) (65 - 65)  BP: 156/81 (04-06-20 @ 08:32) (156/81 - 173/76)  RR: 18 (04-06-20 @ 08:32) (16 - 18)  SpO2: 95% (04-06-20 @ 08:32) (95% - 96%)    Labs:                        14.9   3.68  )-----------( x        ( 06 Apr 2020 08:50 )             43.5     04-06    139  |  102  |  8   ----------------------------<  116<H>  3.4<L>   |  24  |  0.62    Ca    8.8      06 Apr 2020 08:50    TPro  7.1  /  Alb  4.0  /  TBili  0.6  /  DBili  x   /  AST  19  /  ALT  32  /  AlkPhos  61  04-06    CAPILLARY BLOOD GLUCOSE        LIVER FUNCTIONS - ( 06 Apr 2020 08:50 )  Alb: 4.0 g/dL / Pro: 7.1 g/dL / ALK PHOS: 61 U/L / ALT: 32 U/L / AST: 19 U/L / GGT: x             PT/INR - ( 06 Apr 2020 08:50 )   PT: 14.3 sec;   INR: 1.24 ratio         PTT - ( 06 Apr 2020 08:50 )  PTT:29.9 sec    	GEN: NAD, A & O x 4  	HEAD/EYES: NCAT, PERRL, anicteric sclerae, no conjunctival pallor  	ENT: mucus membranes moist, oropharynx WNL, trachea midline  	RESP: lungs CTA with equal breath sounds bilaterally, chest wall nontender and atraumatic  	CV: heart with reg rhythm S1, S2, distal pulses intact and symmetric bilaterally  	ABDOMEN: normoactive bowel sounds, soft, nondistended, nontender, no palpable masses  	: no CVAT  	MSK: extremities atraumatic and nontender, no edema, no asymmetry.  	SKIN: warm, dry, no rash, no bruising, no cyanosis. color appropriate for ethnicity  	NEURO: alert, mentating appropriately, no facial asymmetry. gross motor and coordination are intact. FTN normal. Decreased sensation LUE and LLE compared to R.    Radiology:  < from: CT Angio Head w/ IV Cont (04.06.20 @ 09:59) >  IMPRESSION:    Head CT:    Small age-indeterminate cortical infarcts involve the left parietal lobe.An age-indeterminate lacunar infarct is present the junction of right thalamus and posterior limb right internal capsule.    No evidence for intracranial hemorrhage.    CTA NECK:    The left internal carotid artery stent is patent without significantrestenosis.    Mild stenosis involves the origin of the right internal carotid artery.    Mild to moderate stenoses involve the left vertebral artery.    CTA HEAD:    The intracranial segment of the left vertebral artery is occluded, age-indeterminate.    ANTONIO BARNHART M.D., ATTENDING RADIOLOGIST  This document has been electronically signed. Apr 6 2020 10:09AM    < end of copied text >

## 2020-04-06 NOTE — CONSULT NOTE ADULT - ASSESSMENT
Impression:    L. hemisensory loss likely 2/2 R. thalamic infarction (subacute) 2/2 ischemia 2/2 likely Thromboembolism.   Subacute. LKN 48hrs ago.     Plan:  [] Continue Eliquis. Will consider changing AC agent in outpatient f/u.  [] Atorvastatin 80 qD  [] DVT ppx    Imaging   [] obtain TTE/DALE with bubble study (can be performed outpatient)  [] MRI head non contrast (can be performed outpatient)    Labs   [] CBC, BMP  [] Lipid panel  [] HbA1C    Other  [] dysphagia screen    Follow-up  - f/u w/ Dr. Jones, Vascular Neurology, within 1-2 days of discharge.     - [address]; [place] [address] ([phone]).

## 2020-04-08 NOTE — ED ADULT NURSE NOTE - HOW OFTEN DO YOU HAVE SIX OR MORE DRINKS ON ONE OCCASION?
TELEPHONE VISIT    This visit is a telephone visit.  Due to the restrictions of the COVID-19 pandemic a telephone appointment was the preferred method of medical assessment.  All issues as below are discussed and addressed.  Due to nature of the telephone modality the only components of physical exam that could be done are the elements supported by audio observation.  Patient identification was verified at the start of the visit including the patient's telephone number and physical location in case of emergency.      Patient ID: She is a 59 year old female.    Chief Complaint   Patient presents with   • Md Call   • Follow-up     NEEDS REFILLS AND NEW MED FOR PAIN    • Sinus Problem       HISTORY OF PRESENT ILLNESS:  Pt verbally consented to a TELEPHONE VISIT and they state they are speaking to me from home     Pt is working at [employer] and is keeping safe.    Pt mood has been doing well.  Notes has been having anxious   Medications have been working   Pt is not sleeping good at night.     Pt has tried aleve, advil and no help   Ketoprofen they stopped making it.  Notes needs something else for the aches and pains when working.  Notes using it for headaches.   ACTIVE PROBLEMS:  Patient Active Problem List   Diagnosis   • Anxiety   • Herpes simplex   • Arthralgia       No past medical history on file.      Past Surgical History:   Procedure Laterality Date   • LAMINECTOMY,CERVICAL     • TRIGGER FINGER RELEASE Bilateral          Family History   Problem Relation Age of Onset   • Myocardial Infarction Mother    • Diabetes Mother    • Hypertension Mother    • Other Father         Double bypass   • Thyroid Sister         SOCIAL HISTORY  Tobacco Use:  reports that she has never smoked. She has never used smokeless tobacco.  Alcohol Use:  reports current alcohol use.  Drug Use:  has no history on file for drug.    ALLERGIES:  ALLERGIES: no known allergies.    MEDICATIONS:  Current Outpatient Medications   Medication   •  cetirizine (ZYRTEC) 10 MG tablet   • LORazepam (ATIVAN) 0.5 MG tablet   • penciclovir (DENAVIR) 1 % cream   • buPROPion (WELLBUTRIN XL) 150 MG 24 hr tablet   • diclofenac (VOLTAREN) 75 MG EC tablet   • penciclovir (DENAVIR) 1 % cream     No current facility-administered medications for this visit.          REVIEW OF SYSTEMS  Review of Systems    SCREENING:  Depression Screening:    PHQ2/9:  Initial depression screening score:: 0  Total depressive symptoms score (PHQ9): : 0       GDS:          Mood:  Normal    Interventions:  No intervention needed at this time.     OBJECTIVE     Due to the nature of this visit no vital exams were able to be taken  Physical Exam        ASSESSMENT/PLAN  Problem List Items Addressed This Visit        Behavioral    Anxiety       Musculoskeletal    Arthralgia - Primary       Other    Herpes simplex            Patient was advised to call if they experience any new or worsening symptoms.  Patient verbalized understanding of all that was discussed and is in agreement with plan of care.  Did advise patient to contact the office via portal or phone call if they have any remaining questions.      TOTAL TIME OF PHONE ENCOUNTER VISIT: 13.58 min    Return in about 6 months (around 10/8/2020) for CPE.  Electronically signed by: Bree Hill MD  4/8/2020   Less than Monthly

## 2020-04-09 ENCOUNTER — APPOINTMENT (OUTPATIENT)
Dept: CARDIOLOGY | Facility: CLINIC | Age: 65
End: 2020-04-09

## 2020-04-10 ENCOUNTER — APPOINTMENT (OUTPATIENT)
Dept: NEUROLOGY | Facility: CLINIC | Age: 65
End: 2020-04-10
Payer: MEDICARE

## 2020-04-10 ENCOUNTER — NON-APPOINTMENT (OUTPATIENT)
Age: 65
End: 2020-04-10

## 2020-04-10 ENCOUNTER — APPOINTMENT (OUTPATIENT)
Dept: CARDIOLOGY | Facility: CLINIC | Age: 65
End: 2020-04-10
Payer: MEDICARE

## 2020-04-10 VITALS
BODY MASS INDEX: 26.34 KG/M2 | SYSTOLIC BLOOD PRESSURE: 146 MMHG | OXYGEN SATURATION: 97 % | HEIGHT: 70 IN | DIASTOLIC BLOOD PRESSURE: 85 MMHG | WEIGHT: 184 LBS | HEART RATE: 61 BPM

## 2020-04-10 DIAGNOSIS — I65.23 OCCLUSION AND STENOSIS OF BILATERAL CAROTID ARTERIES: ICD-10-CM

## 2020-04-10 DIAGNOSIS — G45.1 CAROTID ARTERY SYNDROME (HEMISPHERIC): ICD-10-CM

## 2020-04-10 DIAGNOSIS — Z86.79 PERSONAL HISTORY OF OTHER DISEASES OF THE CIRCULATORY SYSTEM: ICD-10-CM

## 2020-04-10 PROCEDURE — 93000 ELECTROCARDIOGRAM COMPLETE: CPT

## 2020-04-10 PROCEDURE — 99204 OFFICE O/P NEW MOD 45 MIN: CPT | Mod: 95

## 2020-04-10 PROCEDURE — 99214 OFFICE O/P EST MOD 30 MIN: CPT

## 2020-04-10 NOTE — DISCUSSION/SUMMARY
[FreeTextEntry1] : In light of recent self discontinuation of Eliquis, pt will continue on regular Eliquis dose bid. Pt will given an immediate referral for neurology. Pt will continue his other current medications. Pt will follow up in 2 months.

## 2020-04-10 NOTE — DATA REVIEWED
[de-identified] : CT Head small age indeterminate cortical infarcts involving left parietal  lobe/ Thalamic and internal capsule.\par CTA left ICA stent patent\par mild stenosis Rt ICA\par Left Intracranial segment of left VA is occluded age indeterminate.\par

## 2020-04-10 NOTE — PHYSICAL EXAM
[Person] : oriented to person [Place] : oriented to place [Time] : oriented to time [Short Term Intact] : short term memory impaired [Concentration Intact] : normal concentrating ability [Visual Intact] : visual attention was ~T not ~L decreased [Naming Objects] : no difficulty naming common objects [Repeating Phrases] : no difficulty repeating a phrase [Writing A Sentence] : no difficulty writing a sentence [Fluency] : fluency intact [Comprehension] : comprehension intact [Reading] : reading intact [Past History] : adequate knowledge of personal past history [Cranial Nerves Optic (II)] : visual acuity intact bilaterally,  visual fields full to confrontation, pupils equal round and reactive to light [Cranial Nerves Oculomotor (III)] : extraocular motion intact [Cranial Nerves Trigeminal (V)] : facial sensation intact symmetrically [Cranial Nerves Facial (VII)] : face symmetrical [Cranial Nerves Vestibulocochlear (VIII)] : hearing was intact bilaterally [Cranial Nerves Glossopharyngeal (IX)] : tongue and palate midline [Cranial Nerves Accessory (XI - Cranial And Spinal)] : head turning and shoulder shrug symmetric [Cranial Nerves Hypoglossal (XII)] : there was no tongue deviation with protrusion [Motor Tone] : muscle tone was normal in all four extremities [Motor Strength] : muscle strength was normal in all four extremities [No Muscle Atrophy] : normal bulk in all four extremities [Past-pointing] : there was no past-pointing [Tremor] : no tremor present [Plantar Reflex Right Only] : normal on the right [Plantar Reflex Left Only] : normal on the left [FreeTextEntry4] : Poor historian and recall [FreeTextEntry5] : Left facial numbness [FreeTextEntry6] : No drift or weakness [FreeTextEntry7] : Decreased sensation on left side [FreeTextEntry9] : Can not test [Sclera] : the sclera and conjunctiva were normal [Outer Ear] : the ears and nose were normal in appearance [Neck Appearance] : the appearance of the neck was normal [] : no respiratory distress [No CVA Tenderness] : no ~M costovertebral angle tenderness [Abnormal Walk] : normal gait

## 2020-04-10 NOTE — REVIEW OF SYSTEMS
[Memory Lapses or Loss] : memory loss [Numbness] : numbness [Dizziness] : dizziness [Difficulty Walking] : difficulty walking [Negative] : Heme/Lymph

## 2020-04-10 NOTE — HISTORY OF PRESENT ILLNESS
[Home] : at home, [unfilled] , at the time of the visit. [Medical Office: ([facility])___] : at ~his/her~ medical office located in [place] [Family Member] : family member [Patient] : the patient [FreeTextEntry1] : 65 yo Pt with h/o HTN, HLD, Ch AF, CAD, S/P L. Carotid Endarterectomy, Stent placement seen in [facility] with 2 days h/o left side body numbness and mild weakness on 4/6/20. Work up including CT scan and CTA done and d/c home to be evaluated as out patient.\par Pt subsequently seen by PCP and Cardiologist and recommended to be followed up with neurology. Pt admits that he stopped taking his blood thinners Eliquis for 10 days and restarted just before he went to hospital. Non reliable history.\par As per son Pt lives home alone. Takes his own meds recently forgetful with memory loss. Not taking any medications.\par No headaches nut c/o dizziness on getting up and left side numbness unchanged. Mild weakness persisting.  [FreeTextEntry2] :  Festus Villagran  [FreeTextEntry3] : Son [FreeTextEntry4] : Dr. ELMA Russell

## 2020-04-10 NOTE — DISCUSSION/SUMMARY
[FreeTextEntry1] : Pt with CH AF with left hemisensory mild motor sx with subacute Rt Thalamic infarct\par ischemic/ Thromboembolic events\par \par Pt non compliant with Eliquis per pt's son did not take Eliquis for 10 days prior to the event.\par \par Continue Aspirin.\par Continue Eliquis.\par MRI brain.\par Home PT/OT.\par Need to discuss with cardiologist.\par abstain  from ETOH use.\par Discussed with patient and son at length and education provided regarding secondary stroke prevention.\par \par \par \par

## 2020-04-10 NOTE — REASON FOR VISIT
[Follow-Up - Clinic] : a clinic follow-up of [Carotid Artery Stenosis] : carotid stenosis [Coronary Artery Disease] : coronary artery disease [FreeTextEntry1] : stroke

## 2020-04-10 NOTE — REASON FOR VISIT
[Consultation] : a consultation visit [FreeTextEntry1] : Evaluation after recent hospital ER visit for acute CVA with left side numbness and weakness seen at [facility] on 4/6/20

## 2020-04-10 NOTE — HISTORY OF PRESENT ILLNESS
[FreeTextEntry1] : 63 yo male presents after recent discharge from [facility] after experiencing symptoms of a CVA. Pt has a history of CAD s/p in the past, carotid disease and stent remotely and atrial fibrillation on Eliquis. Pt presents with left facial paresthesia and lower extremity paraesthesia. Pt denies difficulty walking or swallowing. He is accompanied by his son, Festus. Pt has not seen a neurologist since discharge. He does admit to not taking Eliquis for 10 days prior to the admission although his son reports that his memory and ability to relate the facts of the case is impaired. \par Pt denies chest pain or shortness of breath.

## 2020-04-14 ENCOUNTER — APPOINTMENT (OUTPATIENT)
Dept: NEUROLOGY | Facility: CLINIC | Age: 65
End: 2020-04-14

## 2020-04-17 ENCOUNTER — APPOINTMENT (OUTPATIENT)
Dept: MRI IMAGING | Facility: CLINIC | Age: 65
End: 2020-04-17
Payer: MEDICARE

## 2020-04-17 ENCOUNTER — RESULT REVIEW (OUTPATIENT)
Age: 65
End: 2020-04-17

## 2020-04-17 ENCOUNTER — OUTPATIENT (OUTPATIENT)
Dept: OUTPATIENT SERVICES | Facility: HOSPITAL | Age: 65
LOS: 1 days | End: 2020-04-17
Payer: COMMERCIAL

## 2020-04-17 DIAGNOSIS — R20.0 ANESTHESIA OF SKIN: ICD-10-CM

## 2020-04-17 DIAGNOSIS — Z98.89 OTHER SPECIFIED POSTPROCEDURAL STATES: Chronic | ICD-10-CM

## 2020-04-17 DIAGNOSIS — I63.9 CEREBRAL INFARCTION, UNSPECIFIED: ICD-10-CM

## 2020-04-17 PROCEDURE — 70551 MRI BRAIN STEM W/O DYE: CPT

## 2020-04-17 PROCEDURE — 70551 MRI BRAIN STEM W/O DYE: CPT | Mod: 26

## 2020-05-10 ENCOUNTER — RX RENEWAL (OUTPATIENT)
Age: 65
End: 2020-05-10

## 2020-05-12 ENCOUNTER — RX RENEWAL (OUTPATIENT)
Age: 65
End: 2020-05-12

## 2020-05-12 RX ORDER — ATENOLOL 25 MG/1
25 TABLET ORAL
Qty: 180 | Refills: 3 | Status: ACTIVE | COMMUNITY
Start: 2020-05-12 | End: 1900-01-01

## 2020-06-28 ENCOUNTER — RX RENEWAL (OUTPATIENT)
Age: 65
End: 2020-06-28

## 2020-07-14 ENCOUNTER — APPOINTMENT (OUTPATIENT)
Dept: NEUROLOGY | Facility: CLINIC | Age: 65
End: 2020-07-14
Payer: MEDICARE

## 2020-07-14 VITALS
SYSTOLIC BLOOD PRESSURE: 126 MMHG | DIASTOLIC BLOOD PRESSURE: 67 MMHG | HEIGHT: 70 IN | TEMPERATURE: 98 F | BODY MASS INDEX: 25.77 KG/M2 | WEIGHT: 180 LBS | HEART RATE: 60 BPM

## 2020-07-14 DIAGNOSIS — M72.0 PALMAR FASCIAL FIBROMATOSIS [DUPUYTREN]: ICD-10-CM

## 2020-07-14 PROCEDURE — 99215 OFFICE O/P EST HI 40 MIN: CPT

## 2020-07-14 NOTE — DATA REVIEWED
[de-identified] : MRI brain Moderate atrophy and small vessel ischemic changes. Increased signal intensity in the Rt Thalamus developed since 4/6/20 suggesting \par evolution of subacute infarct.

## 2020-07-14 NOTE — HISTORY OF PRESENT ILLNESS
[FreeTextEntry1] : A 64-year-old patient with a history of hypertension, hyperlipidemia, chronic in fibrillation, coronary disease status post left carotid endarterectomy with cardiac stent placement for guard to take his Eliquis and had an episode of left-sided numbness and mild weakness and MRI scan positive for subacute infarct right thalamus.\par Seen and evaluated on tele health visit in April. Started physical therapy with persistent symptoms involving left side with some improvement in his symptoms. Weakness resolved completely but numbness persisting.\par Denies of any headaches, dizziness, focal other symptoms. Coming here in the office for evaluation. Completed physical therapy feels much better. Currently taking medications without problems. Noted to have Dupuytren's contracture involving left hand fifth finger which is not a new problem but bothering him constantly.

## 2020-07-14 NOTE — PHYSICAL EXAM
[General Appearance - Alert] : alert [General Appearance - In No Acute Distress] : in no acute distress [Oriented To Time, Place, And Person] : oriented to person, place, and time [Impaired Insight] : insight and judgment were intact [Affect] : the affect was normal [Person] : oriented to person [Place] : oriented to place [Time] : oriented to time [Concentration Intact] : normal concentrating ability [Visual Intact] : visual attention was ~T not ~L decreased [Naming Objects] : no difficulty naming common objects [Repeating Phrases] : no difficulty repeating a phrase [Writing A Sentence] : no difficulty writing a sentence [Fluency] : fluency intact [Comprehension] : comprehension intact [Reading] : reading intact [Past History] : adequate knowledge of personal past history [Cranial Nerves Optic (II)] : visual acuity intact bilaterally,  visual fields full to confrontation, pupils equal round and reactive to light [Cranial Nerves Oculomotor (III)] : extraocular motion intact [Cranial Nerves Trigeminal (V)] : facial sensation intact symmetrically [Cranial Nerves Facial (VII)] : face symmetrical [Cranial Nerves Vestibulocochlear (VIII)] : hearing was intact bilaterally [Cranial Nerves Glossopharyngeal (IX)] : tongue and palate midline [Cranial Nerves Accessory (XI - Cranial And Spinal)] : head turning and shoulder shrug symmetric [Cranial Nerves Hypoglossal (XII)] : there was no tongue deviation with protrusion [No Muscle Atrophy] : normal bulk in all four extremities [Motor Strength] : muscle strength was normal in all four extremities [Sensation Tactile Decrease] : light touch was intact [Limited Balance] : the patient's balance was impaired [Past-pointing] : there was no past-pointing [Tremor] : no tremor present [2+] : Patella right 2+ [1+] : Ankle jerk left 1+ [Plantar Reflex Right Only] : normal on the right [Plantar Reflex Left Only] : normal on the left [FreeTextEntry7] : Rt side mild numbness [FreeTextEntry8] : Mild unsteadiness on tandem [Sclera] : the sclera and conjunctiva were normal [PERRL With Normal Accommodation] : pupils were equal in size, round, reactive to light, with normal accommodation [Extraocular Movements] : extraocular movements were intact [Oropharynx] : the oropharynx was normal [Outer Ear] : the ears and nose were normal in appearance [Neck Appearance] : the appearance of the neck was normal [Jugular Venous Distention Increased] : there was no jugular-venous distention [Neck Cervical Mass (___cm)] : no neck mass was observed [Thyroid Nodule] : there were no palpable thyroid nodules [Thyroid Diffuse Enlargement] : the thyroid was not enlarged [Auscultation Breath Sounds / Voice Sounds] : lungs were clear to auscultation bilaterally [Heart Rate And Rhythm] : heart rate was normal and rhythm regular [Heart Sounds] : normal S1 and S2 [Murmurs] : no murmurs [Heart Sounds Gallop] : no gallops [Full Pulse] : the pedal pulses are present [Heart Sounds Pericardial Friction Rub] : no pericardial rub [Edema] : there was no peripheral edema [Abdomen Soft] : soft [Bowel Sounds] : normal bowel sounds [Abdomen Tenderness] : non-tender [Abdomen Mass (___ Cm)] : no abdominal mass palpated [No CVA Tenderness] : no ~M costovertebral angle tenderness [No Spinal Tenderness] : no spinal tenderness [Nail Clubbing] : no clubbing  or cyanosis of the fingernails [Musculoskeletal - Swelling] : no joint swelling seen [Motor Tone] : muscle strength and tone were normal [FreeTextEntry1] : Mild unsteady/ Contracture of left hand [Skin Color & Pigmentation] : normal skin color and pigmentation [Skin Turgor] : normal skin turgor [] : no rash

## 2020-07-14 NOTE — REASON FOR VISIT
[Follow-Up: _____] : a [unfilled] follow-up visit [FreeTextEntry1] : Evaluation for subacute CVA with Left UE / face numbness

## 2020-07-14 NOTE — REVIEW OF SYSTEMS
[Leg Weakness] : leg weakness [Numbness] : numbness [Tingling] : no tingling [Negative] : Heme/Lymph [de-identified] : Left hand contracture

## 2020-07-14 NOTE — DISCUSSION/SUMMARY
[FreeTextEntry1] : Patient with chronic gait fibrillation noncompliant with medication developed subacute left hemisensory symptoms with right thalamic infarct\par Recommend continue present medications including\par Eliquis\par Statin and antihypertensive medications.\par Adequate control of blood pressure.\par Noted to have incidental left hand Dupuytren contracture.\par For which patient recommended to see orthopedic hand surgeon.\par Follow up with you further medical problems..\par Patient completed physical therapy felt it did not help him..\par Recommend exercises as tolerated.\par Followup evaluation in 3 months.\par Patient education provided regarding secondary stroke prevention.

## 2020-10-05 ENCOUNTER — RX CHANGE (OUTPATIENT)
Age: 65
End: 2020-10-05

## 2020-10-12 ENCOUNTER — APPOINTMENT (OUTPATIENT)
Dept: NEUROLOGY | Facility: CLINIC | Age: 65
End: 2020-10-12

## 2020-10-12 ENCOUNTER — APPOINTMENT (OUTPATIENT)
Dept: CARDIOLOGY | Facility: CLINIC | Age: 65
End: 2020-10-12

## 2020-11-02 ENCOUNTER — APPOINTMENT (OUTPATIENT)
Dept: CARDIOLOGY | Facility: CLINIC | Age: 65
End: 2020-11-02
Payer: MEDICARE

## 2020-11-02 ENCOUNTER — NON-APPOINTMENT (OUTPATIENT)
Age: 65
End: 2020-11-02

## 2020-11-02 VITALS
HEART RATE: 60 BPM | HEIGHT: 70 IN | WEIGHT: 199 LBS | SYSTOLIC BLOOD PRESSURE: 143 MMHG | BODY MASS INDEX: 28.49 KG/M2 | DIASTOLIC BLOOD PRESSURE: 73 MMHG | OXYGEN SATURATION: 97 %

## 2020-11-02 PROCEDURE — 99214 OFFICE O/P EST MOD 30 MIN: CPT | Mod: 25

## 2020-11-02 PROCEDURE — 93000 ELECTROCARDIOGRAM COMPLETE: CPT

## 2020-11-02 NOTE — DISCUSSION/SUMMARY
[FreeTextEntry1] : CAD/RCA stent 2013: No active cardiac complaints \par \par Carotid disease S/P left carotid endarterectomy: Carotid US ordered \par \par HTN: Mild elevation today after eating in restaurants over the weekend repeat BP 3 weeks \par \par HLD: Continue statin labs ordered \par \par AF: Rate controlled continue oral AC\par \par LVH/MR: Moderate by Echo F/U echo ordered \par \par CVA: Followed with Neurology \par \par Routine OV 3 months \par \par

## 2020-11-02 NOTE — PHYSICAL EXAM
[General Appearance - Well Developed] : well developed [Normal Appearance] : normal appearance [Well Groomed] : well groomed [General Appearance - Well Nourished] : well nourished [No Deformities] : no deformities [General Appearance - In No Acute Distress] : no acute distress [] : no respiratory distress [Respiration, Rhythm And Depth] : normal respiratory rhythm and effort [Exaggerated Use Of Accessory Muscles For Inspiration] : no accessory muscle use [Auscultation Breath Sounds / Voice Sounds] : lungs were clear to auscultation bilaterally [Heart Rate And Rhythm] : heart rate and rhythm were normal [Heart Sounds] : normal S1 and S2 [Abdomen Soft] : soft [Abnormal Walk] : normal gait [Skin Color & Pigmentation] : normal skin color and pigmentation [Oriented To Time, Place, And Person] : oriented to person, place, and time [FreeTextEntry1] : No LE Edema

## 2020-11-02 NOTE — HISTORY OF PRESENT ILLNESS
[FreeTextEntry1] : 64 Y/O gentleman PMH: CAD/RCA stent 2013, Carotid disease S/P left carotid endarterectomy, HTN, HLD, AF, CVA who presents in routine cardiac follow up offering no complaints \par \par Testing and medications reviewed \par \par

## 2021-01-04 ENCOUNTER — APPOINTMENT (OUTPATIENT)
Dept: CARDIOLOGY | Facility: CLINIC | Age: 66
End: 2021-01-04

## 2021-08-06 ENCOUNTER — NON-APPOINTMENT (OUTPATIENT)
Age: 66
End: 2021-08-06

## 2021-08-06 ENCOUNTER — APPOINTMENT (OUTPATIENT)
Dept: CARDIOLOGY | Facility: CLINIC | Age: 66
End: 2021-08-06
Payer: MEDICARE

## 2021-08-06 VITALS
OXYGEN SATURATION: 96 % | WEIGHT: 200 LBS | TEMPERATURE: 98.2 F | DIASTOLIC BLOOD PRESSURE: 70 MMHG | SYSTOLIC BLOOD PRESSURE: 122 MMHG | BODY MASS INDEX: 28.63 KG/M2 | HEIGHT: 70 IN | HEART RATE: 62 BPM

## 2021-08-06 DIAGNOSIS — I77.9 DISORDER OF ARTERIES AND ARTERIOLES, UNSPECIFIED: ICD-10-CM

## 2021-08-06 PROCEDURE — 93000 ELECTROCARDIOGRAM COMPLETE: CPT

## 2021-08-06 PROCEDURE — 99214 OFFICE O/P EST MOD 30 MIN: CPT

## 2021-08-06 NOTE — REASON FOR VISIT
[Symptom and Test Evaluation] : symptom and test evaluation [Follow-Up - Clinic] : a clinic follow-up of [Coronary Artery Disease] : coronary artery disease [Hyperlipidemia] : hyperlipidemia [Hypertension] : hypertension [Medication Management] : Medication management

## 2021-08-06 NOTE — HISTORY OF PRESENT ILLNESS
[FreeTextEntry1] : 66 Y/O gentleman PMH: CAD/RCA stent 2013, Carotid disease S/P left carotid endarterectomy, HTN, HLD, AF, CVA who presents in routine cardiac follow up offering no complaints \par \par Non ischemic ETT 2019. Pt denies chest pain. Past visits with Neuro were reviewed. \par \par

## 2021-08-06 NOTE — DISCUSSION/SUMMARY
[FreeTextEntry1] : CAD/RCA stent 2013: No active cardiac complaints \par \par Carotid disease S/P left carotid endarterectomy: Carotid US ordered \par \par HTN: Mild elevation today after eating in restaurants over the weekend repeat BP 3 weeks . Echo ordered.\par \par HLD: Continue statin labs ordered \par \par AF: Rate controlled continue oral AC\par \par LVH/MR: Moderate by Echo F/U echo ordered \par \par CVA: Followed with Neurology \par \par Routine OV 3 months \par \par

## 2021-08-20 ENCOUNTER — APPOINTMENT (OUTPATIENT)
Dept: CARDIOLOGY | Facility: CLINIC | Age: 66
End: 2021-08-20

## 2021-08-26 ENCOUNTER — APPOINTMENT (OUTPATIENT)
Dept: CARDIOLOGY | Facility: CLINIC | Age: 66
End: 2021-08-26
Payer: MEDICARE

## 2021-08-26 PROCEDURE — 93306 TTE W/DOPPLER COMPLETE: CPT

## 2021-08-31 ENCOUNTER — NON-APPOINTMENT (OUTPATIENT)
Age: 66
End: 2021-08-31

## 2021-09-07 ENCOUNTER — APPOINTMENT (OUTPATIENT)
Dept: INTERNAL MEDICINE | Facility: CLINIC | Age: 66
End: 2021-09-07

## 2021-09-21 ENCOUNTER — NON-APPOINTMENT (OUTPATIENT)
Age: 66
End: 2021-09-21

## 2021-09-22 ENCOUNTER — NON-APPOINTMENT (OUTPATIENT)
Age: 66
End: 2021-09-22

## 2021-09-22 ENCOUNTER — APPOINTMENT (OUTPATIENT)
Dept: INTERNAL MEDICINE | Facility: CLINIC | Age: 66
End: 2021-09-22
Payer: MEDICARE

## 2021-09-22 VITALS
HEART RATE: 65 BPM | RESPIRATION RATE: 14 BRPM | WEIGHT: 182 LBS | DIASTOLIC BLOOD PRESSURE: 70 MMHG | OXYGEN SATURATION: 98 % | TEMPERATURE: 98.2 F | SYSTOLIC BLOOD PRESSURE: 130 MMHG | BODY MASS INDEX: 26.05 KG/M2 | HEIGHT: 70 IN

## 2021-09-22 DIAGNOSIS — Z23 ENCOUNTER FOR IMMUNIZATION: ICD-10-CM

## 2021-09-22 DIAGNOSIS — I63.9 CEREBRAL INFARCTION, UNSPECIFIED: ICD-10-CM

## 2021-09-22 PROCEDURE — G0009: CPT

## 2021-09-22 PROCEDURE — G0439: CPT

## 2021-09-22 PROCEDURE — 90732 PPSV23 VACC 2 YRS+ SUBQ/IM: CPT

## 2021-09-22 RX ORDER — DILTIAZEM HYDROCHLORIDE 240 MG/1
240 CAPSULE, COATED, EXTENDED RELEASE ORAL DAILY
Refills: 0 | Status: COMPLETED | COMMUNITY
End: 2021-09-22

## 2021-09-22 NOTE — ASSESSMENT
[FreeTextEntry1] : HCM: Pneumovax today. Check labs. Discussed FIT for colon ca screening. Will discuss results. \par CVS: BP controlled. Rate controlled. On ASA, atenolol, crestor, eliquis, ramipril. \par

## 2021-09-22 NOTE — REVIEW OF SYSTEMS
[Fever] : no fever [Night Sweats] : no night sweats [Chills] : no chills [Discharge] : no discharge [Vision Problems] : no vision problems [Itching] : no itching [Earache] : no earache [Nasal Discharge] : no nasal discharge [Sore Throat] : no sore throat [Chest Pain] : no chest pain [Palpitations] : no palpitations [Lower Ext Edema] : no lower extremity edema [Shortness Of Breath] : no shortness of breath [Wheezing] : no wheezing [Cough] : no cough [Dyspnea on Exertion] : not dyspnea on exertion [Abdominal Pain] : no abdominal pain [Constipation] : no constipation [Nausea] : no nausea [Diarrhea] : no diarrhea [Vomiting] : no vomiting [Dysuria] : no dysuria [Muscle Pain] : no muscle pain [Muscle Weakness] : no muscle weakness [Skin Rash] : no skin rash [Headache] : no headache [Dizziness] : no dizziness [Suicidal] : not suicidal [Anxiety] : no anxiety [Depression] : no depression [Easy Bleeding] : no easy bleeding [Easy Bruising] : no easy bruising [Swollen Glands] : no swollen glands

## 2021-09-22 NOTE — HEALTH RISK ASSESSMENT
[Good] : ~his/her~  mood as  good [0] : 2) Feeling down, depressed, or hopeless: Not at all (0) [PHQ-2 Negative - No further assessment needed] : PHQ-2 Negative - No further assessment needed [Change in mental status noted] : Change in mental status noted [Retired] : retired [Fully functional (bathing, dressing, toileting, transferring, walking, feeding)] : Fully functional (bathing, dressing, toileting, transferring, walking, feeding) [Fully functional (using the telephone, shopping, preparing meals, housekeeping, doing laundry, using] : Fully functional and needs no help or supervision to perform IADLs (using the telephone, shopping, preparing meals, housekeeping, doing laundry, using transportation, managing medications and managing finances) [No falls in past year] : Patient reported no falls in the past year [] : No [[id number]] : 0 [Reports changes in hearing] : Reports no changes in hearing [Reports changes in vision] : Reports no changes in vision

## 2021-11-15 ENCOUNTER — APPOINTMENT (OUTPATIENT)
Dept: CARDIOLOGY | Facility: CLINIC | Age: 66
End: 2021-11-15
Payer: MEDICARE

## 2021-11-15 ENCOUNTER — NON-APPOINTMENT (OUTPATIENT)
Age: 66
End: 2021-11-15

## 2021-11-15 VITALS
HEART RATE: 66 BPM | SYSTOLIC BLOOD PRESSURE: 130 MMHG | HEIGHT: 70 IN | OXYGEN SATURATION: 95 % | BODY MASS INDEX: 27.77 KG/M2 | WEIGHT: 194 LBS | DIASTOLIC BLOOD PRESSURE: 70 MMHG

## 2021-11-15 DIAGNOSIS — I48.0 PAROXYSMAL ATRIAL FIBRILLATION: ICD-10-CM

## 2021-11-15 DIAGNOSIS — I25.10 ATHEROSCLEROTIC HEART DISEASE OF NATIVE CORONARY ARTERY W/OUT ANGINA PECTORIS: ICD-10-CM

## 2021-11-15 PROCEDURE — 93000 ELECTROCARDIOGRAM COMPLETE: CPT

## 2021-11-15 PROCEDURE — 99214 OFFICE O/P EST MOD 30 MIN: CPT

## 2021-11-15 RX ORDER — RAMIPRIL 5 MG/1
5 CAPSULE ORAL
Qty: 90 | Refills: 1 | Status: DISCONTINUED | COMMUNITY
Start: 2021-06-22 | End: 2021-11-15

## 2021-11-15 NOTE — DISCUSSION/SUMMARY
[FreeTextEntry1] : CAD/RCA stent 2013: No active cardiac complaints testing reviewed will return for US Aorta and carotid US \par \par Carotid disease S/P left carotid endarterectomy: F/U Carotid US ordered \par \par HTN: Controlled \par \par HLD: Continue statin labs ordered \par \par AF: Rate controlled continue oral AC ( Eliquis )\par \par MR: Moderate by Echo will monitor periodically \par \par Routine OV 3 months \par \par

## 2021-11-15 NOTE — PHYSICAL EXAM
[Normal S1, S2] : normal S1, S2 [Soft] : abdomen soft [Non Tender] : non-tender [No Edema] : no edema [No Rash] : no rash [Normal] : moves all extremities, no focal deficits, normal speech [Alert and Oriented] : alert and oriented

## 2021-11-15 NOTE — HISTORY OF PRESENT ILLNESS
[FreeTextEntry1] : 67 Y/O gentleman PMH: CAD/RCA stent 2013, Carotid disease S/P left carotid endarterectomy, HTN, HLD, AF, CVA who presents in routine cardiac follow up offering no complaints \par \par Testing and medications reviewed \par Nuclear stress 2019\par Echo 8/26/21 Mild LVH Mild-moderate MR NL LV SYS FX EF 62% Mild AI\par Carotid US 2019 \par \par

## 2021-12-01 ENCOUNTER — RX RENEWAL (OUTPATIENT)
Age: 66
End: 2021-12-01

## 2022-01-21 ENCOUNTER — RX CHANGE (OUTPATIENT)
Age: 67
End: 2022-01-21

## 2022-01-25 ENCOUNTER — RX CHANGE (OUTPATIENT)
Age: 67
End: 2022-01-25

## 2022-02-16 ENCOUNTER — RX CHANGE (OUTPATIENT)
Age: 67
End: 2022-02-16

## 2022-02-24 ENCOUNTER — RX RENEWAL (OUTPATIENT)
Age: 67
End: 2022-02-24

## 2022-03-01 ENCOUNTER — RX RENEWAL (OUTPATIENT)
Age: 67
End: 2022-03-01

## 2022-03-09 NOTE — ED ADULT NURSE NOTE - NSHISCREENINGQ1_ED_A_ED
No Tranexamic Acid Counseling:  Patient advised of the small risk of bleeding problems with tranexamic acid. They were also instructed to call if they developed any nausea, vomiting or diarrhea. All of the patient's questions and concerns were addressed.

## 2022-05-16 ENCOUNTER — APPOINTMENT (OUTPATIENT)
Dept: CARDIOLOGY | Facility: CLINIC | Age: 67
End: 2022-05-16

## 2022-05-23 ENCOUNTER — RX RENEWAL (OUTPATIENT)
Age: 67
End: 2022-05-23

## 2022-08-03 ENCOUNTER — OUTPATIENT (OUTPATIENT)
Dept: OUTPATIENT SERVICES | Facility: HOSPITAL | Age: 67
LOS: 1 days | End: 2022-08-03

## 2022-08-03 ENCOUNTER — APPOINTMENT (OUTPATIENT)
Dept: CV DIAGNOSTICS | Facility: HOSPITAL | Age: 67
End: 2022-08-03

## 2022-08-03 DIAGNOSIS — I50.9 HEART FAILURE, UNSPECIFIED: ICD-10-CM

## 2022-08-03 DIAGNOSIS — I67.89 OTHER CEREBROVASCULAR DISEASE: ICD-10-CM

## 2022-08-03 DIAGNOSIS — Z98.89 OTHER SPECIFIED POSTPROCEDURAL STATES: Chronic | ICD-10-CM

## 2022-08-03 DIAGNOSIS — R94.39 ABNORMAL RESULT OF OTHER CARDIOVASCULAR FUNCTION STUDY: ICD-10-CM

## 2022-08-03 PROCEDURE — 93018 CV STRESS TEST I&R ONLY: CPT | Mod: GC

## 2022-08-03 PROCEDURE — 78452 HT MUSCLE IMAGE SPECT MULT: CPT | Mod: 26,MH

## 2022-08-03 PROCEDURE — 93016 CV STRESS TEST SUPVJ ONLY: CPT | Mod: GC

## 2022-10-13 ENCOUNTER — RX RENEWAL (OUTPATIENT)
Age: 67
End: 2022-10-13

## 2022-10-13 RX ORDER — TADALAFIL 5 MG/1
5 TABLET ORAL
Qty: 30 | Refills: 5 | Status: ACTIVE | COMMUNITY
Start: 2017-12-18 | End: 1900-01-01

## 2022-11-11 NOTE — ED PROVIDER NOTE - NS ED ATTENDING STATEMENT MOD
Previously Declined (within the last year) I have personally performed a face to face diagnostic evaluation on this patient. I have reviewed the ACP note and agree with the history, exam and plan of care, except as noted.

## 2022-11-18 ENCOUNTER — RX RENEWAL (OUTPATIENT)
Age: 67
End: 2022-11-18

## 2023-02-26 ENCOUNTER — RX RENEWAL (OUTPATIENT)
Age: 68
End: 2023-02-26

## 2023-02-27 ENCOUNTER — RX RENEWAL (OUTPATIENT)
Age: 68
End: 2023-02-27

## 2023-03-01 NOTE — H&P ADULT - ASSESSMENT
Detail Level: Detailed 64 y/o M with PMH of HTN, Dyslipidemia, CAD s/p PCI, PAD s/p CEA, TIA, and GERD, presented with elevated BP, was found to have A. Fib with RVR. 62 y/o M with PMH of HTN, Dyslipidemia, CAD s/p PCI, PAD s/p CEA, TIA, and GERD, presented with elevated BP, was found to have A. Fib with RVR. Topical Clindamycin Counseling: Patient counseled that this medication may cause skin irritation or allergic reactions.  In the event of skin irritation, the patient was advised to reduce the amount of the drug applied or use it less frequently.   The patient verbalized understanding of the proper use and possible adverse effects of clindamycin.  All of the patient's questions and concerns were addressed. Spironolactone Counseling: Patient advised regarding risks of diarrhea, abdominal pain, hyperkalemia, birth defects (for female patients), liver toxicity and renal toxicity. The patient may need blood work to monitor liver and kidney function and potassium levels while on therapy. The patient verbalized understanding of the proper use and possible adverse effects of spironolactone.  All of the patient's questions and concerns were addressed. Azelaic Acid Pregnancy And Lactation Text: This medication is considered safe during pregnancy and breast feeding. Erythromycin Pregnancy And Lactation Text: This medication is Pregnancy Category B and is considered safe during pregnancy. It is also excreted in breast milk. Birth Control Pills Counseling: Birth Control Pill Counseling: I discussed with the patient the potential side effects of OCPs including but not limited to increased risk of stroke, heart attack, thrombophlebitis, deep venous thrombosis, hepatic adenomas, breast changes, GI upset, headaches, and depression.  The patient verbalized understanding of the proper use and possible adverse effects of OCPs. All of the patient's questions and concerns were addressed. Sarecycline Counseling: Patient advised regarding possible photosensitivity and discoloration of the teeth, skin, lips, tongue and gums.  Patient instructed to avoid sunlight, if possible.  When exposed to sunlight, patients should wear protective clothing, sunglasses, and sunscreen.  The patient was instructed to call the office immediately if the following severe adverse effects occur:  hearing changes, easy bruising/bleeding, severe headache, or vision changes.  The patient verbalized understanding of the proper use and possible adverse effects of sarecycline.  All of the patient's questions and concerns were addressed. Aklief Pregnancy And Lactation Text: It is unknown if this medication is safe to use during pregnancy.  It is unknown if this medication is excreted in breast milk.  Breastfeeding women should use the topical cream on the smallest area of the skin for the shortest time needed while breastfeeding.  Do not apply to nipple and areola. Use Enhanced Medication Counseling?: No Winlevi Pregnancy And Lactation Text: This medication is considered safe during pregnancy and breastfeeding. Bactrim Pregnancy And Lactation Text: This medication is Pregnancy Category D and is known to cause fetal risk.  It is also excreted in breast milk. Erythromycin Counseling:  I discussed with the patient the risks of erythromycin including but not limited to GI upset, allergic reaction, drug rash, diarrhea, increase in liver enzymes, and yeast infections. Winlevi Counseling:  I discussed with the patient the risks of topical clascoterone including but not limited to erythema, scaling, itching, and stinging. Patient voiced their understanding. Azithromycin Pregnancy And Lactation Text: This medication is considered safe during pregnancy and is also secreted in breast milk. High Dose Vitamin A Pregnancy And Lactation Text: High dose vitamin A therapy is contraindicated during pregnancy and breast feeding. Tetracycline Counseling: Patient counseled regarding possible photosensitivity and increased risk for sunburn.  Patient instructed to avoid sunlight, if possible.  When exposed to sunlight, patients should wear protective clothing, sunglasses, and sunscreen.  The patient was instructed to call the office immediately if the following severe adverse effects occur:  hearing changes, easy bruising/bleeding, severe headache, or vision changes.  The patient verbalized understanding of the proper use and possible adverse effects of tetracycline.  All of the patient's questions and concerns were addressed. Patient understands to avoid pregnancy while on therapy due to potential birth defects. Benzoyl Peroxide Pregnancy And Lactation Text: This medication is Pregnancy Category C. It is unknown if benzoyl peroxide is excreted in breast milk. Topical Sulfur Applications Counseling: Topical Sulfur Counseling: Patient counseled that this medication may cause skin irritation or allergic reactions.  In the event of skin irritation, the patient was advised to reduce the amount of the drug applied or use it less frequently.   The patient verbalized understanding of the proper use and possible adverse effects of topical sulfur application.  All of the patient's questions and concerns were addressed. Doxycycline Counseling:  Patient counseled regarding possible photosensitivity and increased risk for sunburn.  Patient instructed to avoid sunlight, if possible.  When exposed to sunlight, patients should wear protective clothing, sunglasses, and sunscreen.  The patient was instructed to call the office immediately if the following severe adverse effects occur:  hearing changes, easy bruising/bleeding, severe headache, or vision changes.  The patient verbalized understanding of the proper use and possible adverse effects of doxycycline.  All of the patient's questions and concerns were addressed. Isotretinoin Pregnancy And Lactation Text: This medication is Pregnancy Category X and is considered extremely dangerous during pregnancy. It is unknown if it is excreted in breast milk. Dapsone Counseling: I discussed with the patient the risks of dapsone including but not limited to hemolytic anemia, agranulocytosis, rashes, methemoglobinemia, kidney failure, peripheral neuropathy, headaches, GI upset, and liver toxicity.  Patients who start dapsone require monitoring including baseline LFTs and weekly CBCs for the first month, then every month thereafter.  The patient verbalized understanding of the proper use and possible adverse effects of dapsone.  All of the patient's questions and concerns were addressed. Spironolactone Pregnancy And Lactation Text: This medication can cause feminization of the male fetus and should be avoided during pregnancy. The active metabolite is also found in breast milk. High Dose Vitamin A Counseling: Side effects reviewed, pt to contact office should one occur. Patient Specific Counseling (Will Not Stick From Patient To Patient): ~~> \\nRisks vs. Benefits of starting Isotretinoin discussed, OK to begin if patient decides\\nStarting Doxycycline- side effects and practices to avoid side effects discussed\\nStarting Clindamycin gel QAM Sarecycline Pregnancy And Lactation Text: This medication is Pregnancy Category D and not consider safe during pregnancy. It is also excreted in breast milk. Birth Control Pills Pregnancy And Lactation Text: This medication should be avoided if pregnant and for the first 30 days post-partum. Isotretinoin Counseling: Patient should get monthly blood tests, not donate blood, not drive at night if vision affected, not share medication, and not undergo elective surgery for 6 months after tx completed. Side effects reviewed, pt to contact office should one occur. Azelaic Acid Counseling: Patient counseled that medicine may cause skin irritation and to avoid applying near the eyes.  In the event of skin irritation, the patient was advised to reduce the amount of the drug applied or use it less frequently.   The patient verbalized understanding of the proper use and possible adverse effects of azelaic acid.  All of the patient's questions and concerns were addressed. Tazorac Pregnancy And Lactation Text: This medication is not safe during pregnancy. It is unknown if this medication is excreted in breast milk. Aklief counseling:  Patient advised to apply a pea-sized amount only at bedtime and wait 30 minutes after washing their face before applying.  If too drying, patient may add a non-comedogenic moisturizer.  The most commonly reported side effects including irritation, redness, scaling, dryness, stinging, burning, itching, and increased risk of sunburn.  The patient verbalized understanding of the proper use and possible adverse effects of retinoids.  All of the patient's questions and concerns were addressed. Topical Retinoid Pregnancy And Lactation Text: This medication is Pregnancy Category C. It is unknown if this medication is excreted in breast milk. Tazorac Counseling:  Patient advised that medication is irritating and drying.  Patient may need to apply sparingly and wash off after an hour before eventually leaving it on overnight.  The patient verbalized understanding of the proper use and possible adverse effects of tazorac.  All of the patient's questions and concerns were addressed. Bactrim Counseling:  I discussed with the patient the risks of sulfa antibiotics including but not limited to GI upset, allergic reaction, drug rash, diarrhea, dizziness, photosensitivity, and yeast infections.  Rarely, more serious reactions can occur including but not limited to aplastic anemia, agranulocytosis, methemoglobinemia, blood dyscrasias, liver or kidney failure, lung infiltrates or desquamative/blistering drug rashes. Topical Retinoid counseling:  Patient advised to apply a pea-sized amount only at bedtime and wait 30 minutes after washing their face before applying.  If too drying, patient may add a non-comedogenic moisturizer. The patient verbalized understanding of the proper use and possible adverse effects of retinoids.  All of the patient's questions and concerns were addressed. Topical Sulfur Applications Pregnancy And Lactation Text: This medication is Pregnancy Category C and has an unknown safety profile during pregnancy. It is unknown if this topical medication is excreted in breast milk. Doxycycline Pregnancy And Lactation Text: This medication is Pregnancy Category D and not consider safe during pregnancy. It is also excreted in breast milk but is considered safe for shorter treatment courses. Minocycline Counseling: Patient advised regarding possible photosensitivity and discoloration of the teeth, skin, lips, tongue and gums.  Patient instructed to avoid sunlight, if possible.  When exposed to sunlight, patients should wear protective clothing, sunglasses, and sunscreen.  The patient was instructed to call the office immediately if the following severe adverse effects occur:  hearing changes, easy bruising/bleeding, severe headache, or vision changes.  The patient verbalized understanding of the proper use and possible adverse effects of minocycline.  All of the patient's questions and concerns were addressed. Azithromycin Counseling:  I discussed with the patient the risks of azithromycin including but not limited to GI upset, allergic reaction, drug rash, diarrhea, and yeast infections. Dapsone Pregnancy And Lactation Text: This medication is Pregnancy Category C and is not considered safe during pregnancy or breast feeding. Topical Clindamycin Pregnancy And Lactation Text: This medication is Pregnancy Category B and is considered safe during pregnancy. It is unknown if it is excreted in breast milk. Benzoyl Peroxide Counseling: Patient counseled that medicine may cause skin irritation and bleach clothing.  In the event of skin irritation, the patient was advised to reduce the amount of the drug applied or use it less frequently.   The patient verbalized understanding of the proper use and possible adverse effects of benzoyl peroxide.  All of the patient's questions and concerns were addressed.

## 2023-03-08 ENCOUNTER — RX RENEWAL (OUTPATIENT)
Age: 68
End: 2023-03-08

## 2023-03-12 ENCOUNTER — RX RENEWAL (OUTPATIENT)
Age: 68
End: 2023-03-12

## 2023-03-13 RX ORDER — TAMSULOSIN HYDROCHLORIDE 0.4 MG/1
0.4 CAPSULE ORAL
Qty: 30 | Refills: 0 | Status: ACTIVE | COMMUNITY
Start: 2021-12-01 | End: 1900-01-01

## 2023-03-14 ENCOUNTER — APPOINTMENT (OUTPATIENT)
Dept: INTERNAL MEDICINE | Facility: CLINIC | Age: 68
End: 2023-03-14

## 2023-03-15 ENCOUNTER — APPOINTMENT (OUTPATIENT)
Dept: INTERNAL MEDICINE | Facility: CLINIC | Age: 68
End: 2023-03-15
Payer: MEDICARE

## 2023-03-15 VITALS
DIASTOLIC BLOOD PRESSURE: 70 MMHG | TEMPERATURE: 98.9 F | OXYGEN SATURATION: 97 % | RESPIRATION RATE: 16 BRPM | WEIGHT: 200 LBS | SYSTOLIC BLOOD PRESSURE: 140 MMHG | HEART RATE: 68 BPM | HEIGHT: 70 IN | BODY MASS INDEX: 28.63 KG/M2

## 2023-03-15 VITALS — SYSTOLIC BLOOD PRESSURE: 132 MMHG | DIASTOLIC BLOOD PRESSURE: 68 MMHG

## 2023-03-15 DIAGNOSIS — R20.0 ANESTHESIA OF SKIN: ICD-10-CM

## 2023-03-15 DIAGNOSIS — I63.81 OTHER CEREBRAL INFARCTION DUE TO OCCLUSION OR STENOSIS OF SMALL ARTERY: ICD-10-CM

## 2023-03-15 DIAGNOSIS — N40.0 BENIGN PROSTATIC HYPERPLASIA WITHOUT LOWER URINARY TRACT SYMPMS: ICD-10-CM

## 2023-03-15 DIAGNOSIS — Z00.00 ENCOUNTER FOR GENERAL ADULT MEDICAL EXAMINATION W/OUT ABNORMAL FINDINGS: ICD-10-CM

## 2023-03-15 PROCEDURE — G0439: CPT

## 2023-03-15 NOTE — REVIEW OF SYSTEMS
[Fever] : no fever [Chills] : no chills [Night Sweats] : no night sweats [Discharge] : no discharge [Vision Problems] : no vision problems [Itching] : no itching [Earache] : no earache [Nasal Discharge] : no nasal discharge [Sore Throat] : no sore throat [Chest Pain] : no chest pain [Palpitations] : no palpitations [Lower Ext Edema] : no lower extremity edema [Shortness Of Breath] : no shortness of breath [Wheezing] : no wheezing [Cough] : no cough [Dyspnea on Exertion] : not dyspnea on exertion [Abdominal Pain] : no abdominal pain [Nausea] : no nausea [Constipation] : no constipation [Diarrhea] : no diarrhea [Vomiting] : no vomiting [Melena] : no melena [Dysuria] : no dysuria [Muscle Pain] : no muscle pain [Muscle Weakness] : no muscle weakness [Itching] : no itching [Skin Rash] : no skin rash [Headache] : no headache [Dizziness] : no dizziness [Suicidal] : not suicidal [Anxiety] : no anxiety [Depression] : no depression [Easy Bleeding] : no easy bleeding [Easy Bruising] : no easy bruising [Swollen Glands] : no swollen glands

## 2023-03-15 NOTE — ASSESSMENT
[FreeTextEntry1] : [facility]: Urged him to follow-up with Dr Potts. HR, BP controlled. \par HCM: Discussed FIT for colon ca screening. Recommended shingrix at the pharmacy. Check labs. Will dicuss results. \par

## 2023-03-15 NOTE — HEALTH RISK ASSESSMENT
[Good] : ~his/her~  mood as  good [No] : No [No falls in past year] : Patient reported no falls in the past year [0] : 2) Feeling down, depressed, or hopeless: Not at all (0) [PHQ-2 Negative - No further assessment needed] : PHQ-2 Negative - No further assessment needed [With Family] : lives with family [Fully functional (bathing, dressing, toileting, transferring, walking, feeding)] : Fully functional (bathing, dressing, toileting, transferring, walking, feeding) [Fully functional (using the telephone, shopping, preparing meals, housekeeping, doing laundry, using] : Fully functional and needs no help or supervision to perform IADLs (using the telephone, shopping, preparing meals, housekeeping, doing laundry, using transportation, managing medications and managing finances) [Reports normal functional visual acuity (ie: able to read med bottle)] : Reports normal functional visual acuity [Smoke Detector] : smoke detector [Carbon Monoxide Detector] : carbon monoxide detector [Seat Belt] :  uses seat belt [With Patient/Caregiver] : , with patient/caregiver [Name: ___] : Health Care Proxy's Name: [unfilled]  [Relationship: ___] : Relationship: [unfilled] [Never] : Never [[id number]] : 0 [Change in mental status noted] : No change in mental status noted [Reports changes in hearing] : Reports no changes in hearing [Reports changes in vision] : Reports no changes in vision [AdvancecareDate] : 03/23

## 2023-03-15 NOTE — PHYSICAL EXAM
[No Acute Distress] : no acute distress [Normal Sclera/Conjunctiva] : normal sclera/conjunctiva [PERRL] : pupils equal round and reactive to light [EOMI] : extraocular movements intact [Normal TMs] : both tympanic membranes were normal [No Lymphadenopathy] : no lymphadenopathy [Supple] : supple [Thyroid Normal, No Nodules] : the thyroid was normal and there were no nodules present [No Respiratory Distress] : no respiratory distress  [No Accessory Muscle Use] : no accessory muscle use [Clear to Auscultation] : lungs were clear to auscultation bilaterally [Normal Rate] : normal rate  [Normal S1, S2] : normal S1 and S2 [No Murmur] : no murmur heard [No Edema] : there was no peripheral edema [Soft] : abdomen soft [Non Tender] : non-tender [Non-distended] : non-distended [Normal Bowel Sounds] : normal bowel sounds [Normal Posterior Cervical Nodes] : no posterior cervical lymphadenopathy [Normal Anterior Cervical Nodes] : no anterior cervical lymphadenopathy [No Spinal Tenderness] : no spinal tenderness [Grossly Normal Strength/Tone] : grossly normal strength/tone [No Focal Deficits] : no focal deficits [Normal Gait] : normal gait [Normal Affect] : the affect was normal [Normal Insight/Judgement] : insight and judgment were intact

## 2023-03-16 LAB
ALBUMIN SERPL ELPH-MCNC: 4.2 G/DL
ALP BLD-CCNC: 67 U/L
ALT SERPL-CCNC: 18 U/L
ANION GAP SERPL CALC-SCNC: 13 MMOL/L
AST SERPL-CCNC: 15 U/L
BASOPHILS # BLD AUTO: 0.05 K/UL
BASOPHILS NFR BLD AUTO: 0.7 %
BILIRUB SERPL-MCNC: 0.3 MG/DL
BUN SERPL-MCNC: 11 MG/DL
CALCIUM SERPL-MCNC: 9.2 MG/DL
CHLORIDE SERPL-SCNC: 103 MMOL/L
CHOLEST SERPL-MCNC: 125 MG/DL
CO2 SERPL-SCNC: 24 MMOL/L
CREAT SERPL-MCNC: 0.75 MG/DL
EGFR: 98 ML/MIN/1.73M2
EOSINOPHIL # BLD AUTO: 0.2 K/UL
EOSINOPHIL NFR BLD AUTO: 2.9 %
ESTIMATED AVERAGE GLUCOSE: 108 MG/DL
GLUCOSE SERPL-MCNC: 134 MG/DL
HBA1C MFR BLD HPLC: 5.4 %
HCT VFR BLD CALC: 44.7 %
HDLC SERPL-MCNC: 32 MG/DL
HGB BLD-MCNC: 14.8 G/DL
IMM GRANULOCYTES NFR BLD AUTO: 0.3 %
LDLC SERPL CALC-MCNC: 45 MG/DL
LYMPHOCYTES # BLD AUTO: 1.81 K/UL
LYMPHOCYTES NFR BLD AUTO: 25.9 %
MAN DIFF?: NORMAL
MCHC RBC-ENTMCNC: 30.2 PG
MCHC RBC-ENTMCNC: 33.1 GM/DL
MCV RBC AUTO: 91.2 FL
MONOCYTES # BLD AUTO: 0.54 K/UL
MONOCYTES NFR BLD AUTO: 7.7 %
NEUTROPHILS # BLD AUTO: 4.36 K/UL
NEUTROPHILS NFR BLD AUTO: 62.5 %
NONHDLC SERPL-MCNC: 93 MG/DL
PLATELET # BLD AUTO: 176 K/UL
POTASSIUM SERPL-SCNC: 4 MMOL/L
PROT SERPL-MCNC: 6.8 G/DL
PSA SERPL-MCNC: 0.47 NG/ML
RBC # BLD: 4.9 M/UL
RBC # FLD: 12.8 %
SODIUM SERPL-SCNC: 140 MMOL/L
TRIGL SERPL-MCNC: 240 MG/DL
TSH SERPL-ACNC: 1.46 UIU/ML
WBC # FLD AUTO: 6.98 K/UL

## 2023-06-16 NOTE — ED ADULT NURSE NOTE - SCORE
Assessment & Plan     Moderate dementia without behavioral disturbance, psychotic disturbance, mood disturbance, or anxiety, unspecified dementia type (H)  Sleeping a lot, minimal stimulation or interaction. Has declined efforts by daughter to get out or consider more community living settings. She is primary caregiver and also working full time. At this time, no significant safety concerns, but do have concerns regarding caregiver stress. Continue to support.     Restless Legs Syndrome / Iron deficiency  Ongoing sx despite increase in oral iron. Recheck iron. Consider PM increase in gabapentin. Discussed potential causes of iron deficiency, pt and daughter decline more invasive work up with endoscopy which is very reasonable.   - Iron and iron binding capacity    Type 2 diabetes mellitus without complication, without long-term current use of insulin (H)  Diet controlled. Monitor   - Hemoglobin A1c    Hypothyroidism, unspecified type  Pending, some ongoing fatigue. Suspect multifactorial, however.   - Lipid Profile (Chol, Trig, HDL, LDL calc)    Benign essential hypertension  - Basic metabolic panel    Subacute cough  Intermittent cough for the last month without wheeze, shortness of breath, fevers, chills.   - XR Chest 2 Views    35 minutes spent by me on the date of the encounter doing chart review, review of test results, interpretation of tests, patient visit and documentation       Return in about 3 months (around 9/16/2023) for CDM.    Shabnam Burgos MD  [facility] - [place]    Subjective   Graham Mills is a 89 year old, presenting for the following health issues:  Diabetes, Lipids, Hypertension, and Thyroid Problem         View : No data to display.              HPI     Hyperlipidemia Follow-Up      Are you regularly taking any medication or supplement to lower your cholesterol?   Yes- amlodipine     Are you having muscle aches or other side effects that you think could be caused by your  "cholesterol lowering medication?  No    Hypertension Follow-up      Do you check your blood pressure regularly outside of the clinic? No     Are you following a low salt diet? No    Are your blood pressures ever more than 140 on the top number (systolic) OR more   than 90 on the bottom number (diastolic), for example 140/90? Yes    BP Readings from Last 2 Encounters:   06/16/23 135/67   03/08/23 138/66     Hemoglobin A1C (%)   Date Value   12/07/2022 6.6 (H)   04/01/2022 6.1 (H)   10/27/2020 6.4 (H)   11/14/2017 6.0     LDL Cholesterol Calculated (mg/dL)   Date Value   07/30/2021 127 (H)   11/14/2017 107 (H)   09/20/2016 84       Chronic Kidney Disease Follow-up      Do you take any over the counter pain medicine?: No    Hypothyroidism Follow-up      Since last visit, patient describes the following symptoms: dry skin, fatigue and hair loss    Fatigue, ongoing restless leg sx- mainly at night. No pain. No change in bowel or bladder habits. Daughter managing all iADLs.     Daughter notes 'croupy' type cough over the last month. Intermittent. No other s/s of infection, wheeze, sob.     Review of Systems   Constitutional, HEENT, cardiovascular, pulmonary, gi and gu systems are negative, except as otherwise noted.      Objective    /67 (BP Location: Left arm, Patient Position: Sitting, Cuff Size: Adult Regular)   Pulse 76   Temp 97.3  F (36.3  C) (Tympanic)   Resp 18   Ht 1.588 m (5' 2.5\")   Wt 79.4 kg (175 lb)   SpO2 97%   BMI 31.50 kg/m    Body mass index is 31.5 kg/m .  Physical Exam   GENERAL: healthy and no distress  NECK: no adenopathy, no asymmetry, masses, or scars and thyroid normal to palpation  RESP: lungs clear to auscultation - no rales, rhonchi or wheezes  CV: regular rate and rhythm, normal S1 S2, no S3 or S4, no murmur, click or rub, no peripheral edema and peripheral pulses strong  ABDOMEN: soft, nontender, no hepatosplenomegaly, no masses and bowel sounds normal  MS: no gross " musculoskeletal defects noted, no edema  SKIN: no suspicious lesions or rashes  NEURO: Normal strength and tone, mentation intact and speech normal  PSYCH: mentation appears normal, affect normal/bright    Results for orders placed or performed in visit on 06/16/23   XR Chest 2 Views     Status: None    Narrative    XR CHEST 2 VIEWS    HISTORY: 89 years Male Subacute cough    COMPARISON: 11/14/2022    TECHNIQUE: 2 views of the chest were obtained.    FINDINGS: Two views of the chest were obtained. Heart size and  pulmonary vascularity are within normal limits, lungs are clear on  both views. No consolidating air space opacities are present.          Impression    IMPRESSION: Clear chest.    MARICRUZ DELGADO MD         SYSTEM ID:  [id number]       Answers for HPI/ROS submitted by the patient on 6/16/2023  If you checked off any problems, how difficult have these problems made it for you to do your work, take care of things at home, or get along with other people?: Not difficult at all  PHQ9 TOTAL SCORE: 2  THOMAS 7 TOTAL SCORE: 0       5

## 2023-09-06 ENCOUNTER — RX RENEWAL (OUTPATIENT)
Age: 68
End: 2023-09-06

## 2023-09-06 RX ORDER — TAMSULOSIN HYDROCHLORIDE 0.4 MG/1
0.4 CAPSULE ORAL
Qty: 90 | Refills: 1 | Status: ACTIVE | COMMUNITY
Start: 2023-03-15 | End: 1900-01-01

## 2023-11-30 NOTE — ED ADULT NURSE NOTE - CAS DISCH TRANSFER METHOD
11/30/2023    Assessment:       Diagnosis Orders   1. Type 2 diabetes mellitus with hyperglycemia, with long-term current use of insulin ([facility])  Comprehensive Metabolic Panel    Hemoglobin A1C        History of Pancreatitis   H.O. CRF   AVG am glucose 120-150  Will try Dexcom at next visit with new phone    PLAN:     Humalog 75/25 Inject before breakfast and dinner 15 units if glucose < 150, 20 units if glucose >150 twice a day  Continue Farxiga 5 mg daily   Continue Zetia 10 mg daily  Repeat labs 4 to 5 days before your next appointment   Follow up in 3 months     Orders Placed This Encounter   Procedures    Comprehensive Metabolic Panel     Standing Status:   Future     Standing Expiration Date:   11/30/2024    Hemoglobin A1C     Standing Status:   Future     Standing Expiration Date:   11/30/2024     No orders of the defined types were placed in this encounter. Return in about 3 months (around 2/29/2024) for Diabetes. Subjective:     Chief Complaint   Patient presents with    Diabetes     Vitals:    11/30/23 1028 11/30/23 1038   BP: (!) 160/98 (!) 145/94   Site: Left Upper Arm    Pulse: 86    SpO2: 94%    Weight: 62.6 kg (138 lb)    Height: 1.656 m (5' 5.2\")      Wt Readings from Last 3 Encounters:   11/30/23 62.6 kg (138 lb)   08/31/23 57.2 kg (126 lb)   05/23/23 60.3 kg (133 lb)     BP Readings from Last 3 Encounters:   11/30/23 (!) 145/94   08/31/23 (!) 144/85   05/23/23 (!) 145/85     [place] is a 79-year-old -American female discharged 9/2020 after hospital admission for hyperglycemia due to uncontrolled diabetes. Her hemoglobin A1c was 16.9. She was started on insulin with excellent glycemic improvement. She was discharged home on Humalog 75/25 mixed insulin with good results. She unfortunately has a history of pancreatitis, and chronic renal failure which limits the utilization of oral diabetic medications. Her hemoglobin A1c has improved to 8%, on increased mixed insulin dose.   No history
Private car

## 2024-02-02 ENCOUNTER — APPOINTMENT (OUTPATIENT)
Dept: NUCLEAR MEDICINE | Facility: CLINIC | Age: 69
End: 2024-02-02

## 2024-08-15 ENCOUNTER — EMERGENCY (EMERGENCY)
Facility: HOSPITAL | Age: 69
LOS: 1 days | Discharge: ANOTHER HOSPITAL | End: 2024-08-15
Attending: EMERGENCY MEDICINE | Admitting: EMERGENCY MEDICINE
Payer: COMMERCIAL

## 2024-08-15 VITALS
RESPIRATION RATE: 16 BRPM | OXYGEN SATURATION: 95 % | DIASTOLIC BLOOD PRESSURE: 90 MMHG | TEMPERATURE: 99 F | HEIGHT: 70 IN | SYSTOLIC BLOOD PRESSURE: 171 MMHG | WEIGHT: 179.46 LBS | HEART RATE: 72 BPM

## 2024-08-15 DIAGNOSIS — Z98.89 OTHER SPECIFIED POSTPROCEDURAL STATES: Chronic | ICD-10-CM

## 2024-08-15 LAB
ALBUMIN SERPL ELPH-MCNC: 3.7 G/DL — SIGNIFICANT CHANGE UP (ref 3.3–5)
ALP SERPL-CCNC: 74 U/L — SIGNIFICANT CHANGE UP (ref 30–120)
ALT FLD-CCNC: 15 U/L — SIGNIFICANT CHANGE UP (ref 10–60)
ANION GAP SERPL CALC-SCNC: 7 MMOL/L — SIGNIFICANT CHANGE UP (ref 5–17)
APTT BLD: 29 SEC — SIGNIFICANT CHANGE UP (ref 24.5–35.6)
AST SERPL-CCNC: 8 U/L — LOW (ref 10–40)
BASOPHILS # BLD AUTO: 0.06 K/UL — SIGNIFICANT CHANGE UP (ref 0–0.2)
BASOPHILS NFR BLD AUTO: 0.9 % — SIGNIFICANT CHANGE UP (ref 0–2)
BILIRUB SERPL-MCNC: 0.5 MG/DL — SIGNIFICANT CHANGE UP (ref 0.2–1.2)
BUN SERPL-MCNC: 7 MG/DL — SIGNIFICANT CHANGE UP (ref 7–23)
CALCIUM SERPL-MCNC: 8.9 MG/DL — SIGNIFICANT CHANGE UP (ref 8.4–10.5)
CHLORIDE SERPL-SCNC: 103 MMOL/L — SIGNIFICANT CHANGE UP (ref 96–108)
CO2 SERPL-SCNC: 27 MMOL/L — SIGNIFICANT CHANGE UP (ref 22–31)
CREAT SERPL-MCNC: 0.87 MG/DL — SIGNIFICANT CHANGE UP (ref 0.5–1.3)
EGFR: 93 ML/MIN/1.73M2 — SIGNIFICANT CHANGE UP
EOSINOPHIL # BLD AUTO: 0.21 K/UL — SIGNIFICANT CHANGE UP (ref 0–0.5)
EOSINOPHIL NFR BLD AUTO: 3 % — SIGNIFICANT CHANGE UP (ref 0–6)
GLUCOSE SERPL-MCNC: 107 MG/DL — HIGH (ref 70–99)
HCT VFR BLD CALC: 40.6 % — SIGNIFICANT CHANGE UP (ref 39–50)
HGB BLD-MCNC: 14 G/DL — SIGNIFICANT CHANGE UP (ref 13–17)
IMM GRANULOCYTES NFR BLD AUTO: 0.3 % — SIGNIFICANT CHANGE UP (ref 0–0.9)
INR BLD: 1.18 RATIO — SIGNIFICANT CHANGE UP (ref 0.85–1.18)
LYMPHOCYTES # BLD AUTO: 1.89 K/UL — SIGNIFICANT CHANGE UP (ref 1–3.3)
LYMPHOCYTES # BLD AUTO: 26.8 % — SIGNIFICANT CHANGE UP (ref 13–44)
MCHC RBC-ENTMCNC: 31.4 PG — SIGNIFICANT CHANGE UP (ref 27–34)
MCHC RBC-ENTMCNC: 34.5 GM/DL — SIGNIFICANT CHANGE UP (ref 32–36)
MCV RBC AUTO: 91 FL — SIGNIFICANT CHANGE UP (ref 80–100)
MONOCYTES # BLD AUTO: 0.49 K/UL — SIGNIFICANT CHANGE UP (ref 0–0.9)
MONOCYTES NFR BLD AUTO: 7 % — SIGNIFICANT CHANGE UP (ref 2–14)
NEUTROPHILS # BLD AUTO: 4.37 K/UL — SIGNIFICANT CHANGE UP (ref 1.8–7.4)
NEUTROPHILS NFR BLD AUTO: 62 % — SIGNIFICANT CHANGE UP (ref 43–77)
NRBC # BLD: 0 /100 WBCS — SIGNIFICANT CHANGE UP (ref 0–0)
PLATELET # BLD AUTO: 175 K/UL — SIGNIFICANT CHANGE UP (ref 150–400)
POTASSIUM SERPL-MCNC: 3.5 MMOL/L — SIGNIFICANT CHANGE UP (ref 3.5–5.3)
POTASSIUM SERPL-SCNC: 3.5 MMOL/L — SIGNIFICANT CHANGE UP (ref 3.5–5.3)
PROT SERPL-MCNC: 7.2 G/DL — SIGNIFICANT CHANGE UP (ref 6–8.3)
PROTHROM AB SERPL-ACNC: 12.8 SEC — SIGNIFICANT CHANGE UP (ref 9.5–13)
RBC # BLD: 4.46 M/UL — SIGNIFICANT CHANGE UP (ref 4.2–5.8)
RBC # FLD: 12.5 % — SIGNIFICANT CHANGE UP (ref 10.3–14.5)
SODIUM SERPL-SCNC: 137 MMOL/L — SIGNIFICANT CHANGE UP (ref 135–145)
TROPONIN I, HIGH SENSITIVITY RESULT: 9.2 NG/L — SIGNIFICANT CHANGE UP
WBC # BLD: 7.04 K/UL — SIGNIFICANT CHANGE UP (ref 3.8–10.5)
WBC # FLD AUTO: 7.04 K/UL — SIGNIFICANT CHANGE UP (ref 3.8–10.5)

## 2024-08-15 PROCEDURE — 99285 EMERGENCY DEPT VISIT HI MDM: CPT

## 2024-08-15 PROCEDURE — 93010 ELECTROCARDIOGRAM REPORT: CPT

## 2024-08-15 PROCEDURE — 70450 CT HEAD/BRAIN W/O DYE: CPT | Mod: 26,XU,MC

## 2024-08-15 PROCEDURE — 70496 CT ANGIOGRAPHY HEAD: CPT | Mod: 26,MC

## 2024-08-15 PROCEDURE — 70498 CT ANGIOGRAPHY NECK: CPT | Mod: 26,MC

## 2024-08-15 NOTE — ED PROVIDER NOTE - NSBENEFITOFTRANSFER_ED_A_ED
none
Obtain Level of Care/Service Not Available at this Facility/Worsening of Condition, Death, or Disability if Patient Does Not Transfer/Continuity of Care at Other Facility

## 2024-08-15 NOTE — ED PROVIDER NOTE - CLINICAL SUMMARY MEDICAL DECISION MAKING FREE TEXT BOX
Patient with 4 to 5 minutes of numbness to his left leg now resolved.  Has normal neurologic exam.  Patient was reportedly disoriented on EMS arrival but is alert now.  No complaints at this time.  Will get CAT scans and labs.  If no acute findings patient can follow-up with his doctor.

## 2024-08-15 NOTE — ED PROVIDER NOTE - PROGRESS NOTE DETAILS
telestroke Francisco- advised no LVO. no fibrinolytic or acute intervention. Advised control BP, check lipids and Hgb A1C. pt asymptomatic currently.Discussed with admitting team Luis, advised transfer to [place] for MRI.

## 2024-08-15 NOTE — ED PROVIDER NOTE - OBJECTIVE STATEMENT
44 y/o male BIBA from home due to numbness to the left leg starting at 1830. pt was playing [employer] ball all day today. pt notes the numbness lasted 4-5 minutes. Pt asymptomatic currently. pt had mini-stroke. BP via EMS. blood sugar 97. pt didnt know date or year when EMS arrived. When friend came over, mentioned hx of alzheimers. pt son called at bedside via pt phone. Reports patient typically unaware of date/month/year, pt does have a hx of dementia. Denies cp, sob, weakness, headache, vomiting, blurred vision, or any other complaints.

## 2024-08-15 NOTE — ED PROVIDER NOTE - ATTENDING APP SHARED VISIT CONTRIBUTION OF CARE
Jose Juárez MD: I have personally performed a face to face diagnostic evaluation on this patient.  I have reviewed the PA note and agree with the history, exam, and plan of care, except as noted.  History and Exam by me shows same findings as documented

## 2024-08-15 NOTE — ED ADULT TRIAGE NOTE - CHIEF COMPLAINT QUOTE
PT BIB EMS from home c/o left lower leg numbness since 1930; played [employer] ball today; symptoms resolved prior to arrival

## 2024-08-15 NOTE — ED PROVIDER NOTE - PHYSICAL EXAMINATION
Constitutional: Awake, Alert, non-toxic. NAD  HEAD: Normocephalic, atraumatic.   EYES: PERRL, EOM intact, conjunctiva and sclera are clear bilaterally. No raccoon eyes.   ENT: TM's and canals normal, no brasher sign. No rhinorrhea, normal pharynx, patent, no tonsillar exudate or enlargement, mucous membranes pink/moist, no erythema, no drooling or stridor.   NECK: Supple, non-tender; no cervical LAD, no JVD, no goiter.  BACK: No midline or paraspinal TTP of cervical/thoracic/lumbar spine, FROM. No ecchymosis or hematomas.   CARDIOVASCULAR: Normal S1, S2; regular rate and rhythm.  RESPIRATORY: Normal respiratory effort; breath sounds CTAB, no wheezes, rhonchi, or rales. Speaking in full sentences. No accessory muscle use.   ABDOMEN: Soft; non-tender, non-distended. Normal bowel sounds x 4. no palpable masses, no bruits, no CVA TTP. No guarding.   EXTREMITIES: Full passive and active ROM in all extremities; non-tender to palpation; distal pulses palpable and symmetric, no edema, no crepitus or step off  SKIN: Warm, dry; good skin turgor, no apparent lesions or rashes, no ecchymosis, brisk capillary refill.  NEURO: A&O x3. Sensory and motor functions are grossly intact. Speech is normal. Appearance and judgement seem appropriate for gender and age. No neurological deficits. Neurovascular sensation intact motor function 5/5 of upper and lower extremities, CN II-XII grossly intact, no ataxia, absent pronator drift, intact cerebellar function. Speech clear, without articulation or word-finding difficulties. Eyes- PERRL bilaterally. EOMs in tact. No nystagmus. No facial droop. NIH- 0

## 2024-08-15 NOTE — ED ADULT NURSE NOTE - NURSING NEURO ORIENTATION
July 31, 2024      Mick KAYODE [place]  [address]  [place] [address]        Dear ,    We are writing to inform you of your test results.      Cholesterol levels are quite high. The triglycerides ( fatty cells in the body) are especially high. This can put you at risk for heart.attacks/strokes in the future. I would like you to come back in 2 weeks and do a fasting lipid profile to establish a pattern. If the triglycerides are above 400 again, we may need to discuss starting meds.     Platelet count is low and mag  was a bit hit. These were most likely incidental. I will repeat there in 2 weeks as well     A1c (diabetes screening) is consistent with prediabetes.  This means that you do NOT  have diabetes but are at high risk for developing diabetes.  You are below threshold for medications at this time.  To help bring this number down and decrease chance of becoming diabetic-would decrease intake of simple carbohydrates (white rice, white bread, pasta, noodles, tortillas, sugary drinks (soda and creamer), potatoes) and try to substitute with complex carbohydrates instead.  Cutting down on late-night eating (last meal around 7 PM) can also help decrease insulin spikes.  Increasing exercise level to at least 30 minutes of aerobic exercise 3 times a week will also help bring this number down.  I would like to check this number again in 3 months to make sure that you have not progressed to diabetes.  I have put in a lab order.   You can make that appointment via [employer].    Resulted Orders   Lipid panel reflex to direct LDL Non-fasting   Result Value Ref Range    Cholesterol 251 (H) <200 mg/dL    Triglycerides 470 (H) <150 mg/dL    Direct Measure HDL 35 (L) >=40 mg/dL    LDL Cholesterol Calculated        Comment:      Cannot estimate LDL when triglyceride exceeds 400 mg/dL    Non HDL Cholesterol 216 (H) <130 mg/dL    Patient Fasting > 8hrs? No     Narrative    Cholesterol  Desirable:  <200  mg/dL    Triglycerides  Normal:  Less than 150 mg/dL  Borderline High:  150-199 mg/dL  High:  200-499 mg/dL  Very High:  Greater than or equal to 500 mg/dL    Direct Measure HDL  Female:  Greater than or equal to 50 mg/dL   Male:  Greater than or equal to 40 mg/dL    LDL Cholesterol  Desirable:  <100mg/dL  Above Desirable:  100-129 mg/dL   Borderline High:  130-159 mg/dL   High:  160-189 mg/dL   Very High:  >= 190 mg/dL    Non HDL Cholesterol  Desirable:  130 mg/dL  Above Desirable:  130-159 mg/dL  Borderline High:  160-189 mg/dL  High:  190-219 mg/dL  Very High:  Greater than or equal to 220 mg/dL   CBC with platelets   Result Value Ref Range    WBC Count 7.9 4.0 - 11.0 10e3/uL    RBC Count 5.40 4.40 - 5.90 10e6/uL    Hemoglobin 15.1 13.3 - 17.7 g/dL    Hematocrit 45.9 40.0 - 53.0 %    MCV 85 78 - 100 fL    MCH 28.0 26.5 - 33.0 pg    MCHC 32.9 31.5 - 36.5 g/dL    RDW 12.8 10.0 - 15.0 %    Platelet Count 148 (L) 150 - 450 10e3/uL   Comprehensive metabolic panel (BMP + Alb, Alk Phos, ALT, AST, Total. Bili, TP)   Result Value Ref Range    Sodium 140 135 - 145 mmol/L    Potassium 3.9 3.4 - 5.3 mmol/L    Carbon Dioxide (CO2) 27 22 - 29 mmol/L    Anion Gap 10 7 - 15 mmol/L    Urea Nitrogen 12.8 6.0 - 20.0 mg/dL    Creatinine 0.97 0.67 - 1.17 mg/dL    GFR Estimate >90 >60 mL/min/1.73m2      Comment:      eGFR calculated using 2021 CKD-EPI equation.    Calcium 9.4 8.8 - 10.4 mg/dL      Comment:      Reference intervals for this test were updated on 7/16/2024 to reflect our healthy population more accurately. There may be differences in the flagging of prior results with similar values performed with this method. Those prior results can be interpreted in the context of the updated reference intervals.    Chloride 103 98 - 107 mmol/L    Glucose 94 70 - 99 mg/dL    Alkaline Phosphatase 67 40 - 150 U/L    AST 38 0 - 45 U/L    ALT 58 0 - 70 U/L    Protein Total 7.5 6.4 - 8.3 g/dL    Albumin 4.5 3.5 - 5.2 g/dL    Bilirubin Total  0.2 <=1.2 mg/dL    Patient Fasting > 8hrs? No    Magnesium   Result Value Ref Range    Magnesium 2.4 (H) 1.7 - 2.3 mg/dL   TSH with free T4 reflex   Result Value Ref Range    TSH 1.37 0.30 - 4.20 uIU/mL   Hemoglobin A1c   Result Value Ref Range    Hemoglobin A1C 5.7 (H) 0.0 - 5.6 %      Comment:      Normal <5.7%   Prediabetes 5.7-6.4%    Diabetes 6.5% or higher     Note: Adopted from ADA consensus guidelines.   Vitamin D Deficiency   Result Value Ref Range    Vitamin D, Total (25-Hydroxy) 25 20 - 50 ng/mL      Comment:      optimum levels    Narrative    Season, race, dietary intake, and treatment affect the concentration of 25-hydroxy-Vitamin D. Values may decrease during winter months and increase during summer months.    Vitamin D determination is routinely performed by an immunoassay specific for 25 hydroxyvitamin D3.  If an individual is on vitamin D2(ergocalciferol) supplementation, please specify 25 OH vitamin D2 and D3 level determination by LCMSMS test VITD23.     Vitamin B12   Result Value Ref Range    Vitamin B12 853 232 - 1,245 pg/mL   LDL cholesterol direct   Result Value Ref Range    LDL Cholesterol Direct 162 (H) <100 mg/dL      Comment:      Age 2-19 years:  Desirable: 0-110 mg/dL   Borderline high: 110-129 mg/dL   High: >= 130 mg/dL    Age 20 years and older:  Desirable: <100mg/dL  Above desirable: 100-129 mg/dL   Borderline high: 130-159 mg/dL   High: 160-189 mg/dL   Very high: >= 190 mg/dL       If you have any questions or concerns, please call the clinic at the number listed above.       Sincerely,      Donna Andrews DO             oriented to person, place and time

## 2024-08-15 NOTE — ED ADULT NURSE NOTE - CHIEF COMPLAINT QUOTE
PT BIB EMS from home c/o left lower leg numbness since 1930; played [employer] ball today; symptoms resolved prior to arrival

## 2024-08-15 NOTE — ED ADULT NURSE NOTE - NSICDXPASTMEDICALHX_GEN_ALL_CORE_FT
PAST MEDICAL HISTORY:  CAD in native artery     Carotid stenosis     Gastric reflux     Hyperlipemia     Hypertension     TIA (transient ischemic attack)      PAST MEDICAL HISTORY:  Alzheimer's disease, early onset     CAD in native artery     Carotid stenosis     Gastric reflux     Hyperlipemia     Hypertension     TIA (transient ischemic attack)

## 2024-08-15 NOTE — ED PROVIDER NOTE - BIRTH SEX
Here for hormone therapy injection, no complaints at this time, Injection given as ordered, tolerated well, no report of pain prior to or after injection. Return to clinic as scheduled.     Site - RB    Testosterone  76 mg  Depo Estradiol  7.5 mg    Clinic Supplied Medication    
Male

## 2024-08-16 ENCOUNTER — TRANSCRIPTION ENCOUNTER (OUTPATIENT)
Age: 69
End: 2024-08-16

## 2024-08-16 VITALS
SYSTOLIC BLOOD PRESSURE: 165 MMHG | HEART RATE: 72 BPM | OXYGEN SATURATION: 98 % | DIASTOLIC BLOOD PRESSURE: 89 MMHG | RESPIRATION RATE: 16 BRPM | TEMPERATURE: 98 F

## 2024-08-16 PROCEDURE — 70498 CT ANGIOGRAPHY NECK: CPT | Mod: MC

## 2024-08-16 PROCEDURE — 82962 GLUCOSE BLOOD TEST: CPT

## 2024-08-16 PROCEDURE — 70496 CT ANGIOGRAPHY HEAD: CPT | Mod: MC

## 2024-08-16 PROCEDURE — 80053 COMPREHEN METABOLIC PANEL: CPT

## 2024-08-16 PROCEDURE — 93005 ELECTROCARDIOGRAM TRACING: CPT

## 2024-08-16 PROCEDURE — 85730 THROMBOPLASTIN TIME PARTIAL: CPT

## 2024-08-16 PROCEDURE — 85025 COMPLETE CBC W/AUTO DIFF WBC: CPT

## 2024-08-16 PROCEDURE — 84484 ASSAY OF TROPONIN QUANT: CPT

## 2024-08-16 PROCEDURE — 99285 EMERGENCY DEPT VISIT HI MDM: CPT | Mod: 25

## 2024-08-16 PROCEDURE — 70450 CT HEAD/BRAIN W/O DYE: CPT | Mod: MC

## 2024-08-16 PROCEDURE — 36415 COLL VENOUS BLD VENIPUNCTURE: CPT

## 2024-08-16 PROCEDURE — 85610 PROTHROMBIN TIME: CPT

## 2024-08-16 NOTE — ED ADULT NURSE REASSESSMENT NOTE - NS ED NURSE REASSESS COMMENT FT1
Pt's son requesting to give pt bedtime medication. Pt's son was educated to wait for pt to be seeing in [place].

## 2024-08-17 ENCOUNTER — TRANSCRIPTION ENCOUNTER (OUTPATIENT)
Age: 69
End: 2024-08-17

## 2024-08-29 PROBLEM — G30.0 ALZHEIMER'S DISEASE WITH EARLY ONSET: Chronic | Status: ACTIVE | Noted: 2024-08-15

## 2024-10-25 ENCOUNTER — EMERGENCY (EMERGENCY)
Facility: HOSPITAL | Age: 69
LOS: 1 days | Discharge: HOME | End: 2024-10-25
Attending: STUDENT IN AN ORGANIZED HEALTH CARE EDUCATION/TRAINING PROGRAM | Admitting: EMERGENCY MEDICINE
Payer: MEDICARE

## 2024-10-25 VITALS
HEART RATE: 69 BPM | OXYGEN SATURATION: 98 % | TEMPERATURE: 98 F | DIASTOLIC BLOOD PRESSURE: 79 MMHG | RESPIRATION RATE: 16 BRPM | SYSTOLIC BLOOD PRESSURE: 157 MMHG

## 2024-10-25 VITALS
RESPIRATION RATE: 16 BRPM | WEIGHT: 195.11 LBS | SYSTOLIC BLOOD PRESSURE: 157 MMHG | OXYGEN SATURATION: 98 % | DIASTOLIC BLOOD PRESSURE: 83 MMHG | HEART RATE: 78 BPM | TEMPERATURE: 98 F | HEIGHT: 69 IN

## 2024-10-25 LAB
ALBUMIN SERPL ELPH-MCNC: 3.6 G/DL — SIGNIFICANT CHANGE UP (ref 3.3–5)
ALP SERPL-CCNC: 63 U/L — SIGNIFICANT CHANGE UP (ref 40–120)
ALT FLD-CCNC: 24 U/L — SIGNIFICANT CHANGE UP (ref 12–78)
ANION GAP SERPL CALC-SCNC: 9 MMOL/L — SIGNIFICANT CHANGE UP (ref 5–17)
APTT BLD: 16.6 SEC — LOW (ref 24.5–35.6)
AST SERPL-CCNC: 29 U/L — SIGNIFICANT CHANGE UP (ref 15–37)
BASOPHILS # BLD AUTO: 0.04 K/UL — SIGNIFICANT CHANGE UP (ref 0–0.2)
BASOPHILS NFR BLD AUTO: 0.5 % — SIGNIFICANT CHANGE UP (ref 0–2)
BILIRUB SERPL-MCNC: 0.6 MG/DL — SIGNIFICANT CHANGE UP (ref 0.2–1.2)
BUN SERPL-MCNC: 12 MG/DL — SIGNIFICANT CHANGE UP (ref 7–23)
CALCIUM SERPL-MCNC: 8.5 MG/DL — SIGNIFICANT CHANGE UP (ref 8.5–10.1)
CHLORIDE SERPL-SCNC: 108 MMOL/L — SIGNIFICANT CHANGE UP (ref 96–108)
CO2 SERPL-SCNC: 25 MMOL/L — SIGNIFICANT CHANGE UP (ref 22–31)
CREAT SERPL-MCNC: 0.66 MG/DL — SIGNIFICANT CHANGE UP (ref 0.5–1.3)
EGFR: 101 ML/MIN/1.73M2 — SIGNIFICANT CHANGE UP
EOSINOPHIL # BLD AUTO: 0.01 K/UL — SIGNIFICANT CHANGE UP (ref 0–0.5)
EOSINOPHIL NFR BLD AUTO: 0.1 % — SIGNIFICANT CHANGE UP (ref 0–6)
GLUCOSE SERPL-MCNC: 123 MG/DL — HIGH (ref 70–99)
HCT VFR BLD CALC: 42.7 % — SIGNIFICANT CHANGE UP (ref 39–50)
HGB BLD-MCNC: 15.3 G/DL — SIGNIFICANT CHANGE UP (ref 13–17)
IMM GRANULOCYTES NFR BLD AUTO: 0.4 % — SIGNIFICANT CHANGE UP (ref 0–0.9)
INR BLD: 1.27 RATIO — HIGH (ref 0.85–1.16)
LYMPHOCYTES # BLD AUTO: 0.68 K/UL — LOW (ref 1–3.3)
LYMPHOCYTES # BLD AUTO: 8.7 % — LOW (ref 13–44)
MCHC RBC-ENTMCNC: 32.1 PG — SIGNIFICANT CHANGE UP (ref 27–34)
MCHC RBC-ENTMCNC: 35.8 GM/DL — SIGNIFICANT CHANGE UP (ref 32–36)
MCV RBC AUTO: 89.5 FL — SIGNIFICANT CHANGE UP (ref 80–100)
MONOCYTES # BLD AUTO: 0.27 K/UL — SIGNIFICANT CHANGE UP (ref 0–0.9)
MONOCYTES NFR BLD AUTO: 3.5 % — SIGNIFICANT CHANGE UP (ref 2–14)
NEUTROPHILS # BLD AUTO: 6.79 K/UL — SIGNIFICANT CHANGE UP (ref 1.8–7.4)
NEUTROPHILS NFR BLD AUTO: 86.8 % — HIGH (ref 43–77)
NRBC # BLD: 0 /100 WBCS — SIGNIFICANT CHANGE UP (ref 0–0)
PLATELET # BLD AUTO: 161 K/UL — SIGNIFICANT CHANGE UP (ref 150–400)
POTASSIUM SERPL-MCNC: 3.6 MMOL/L — SIGNIFICANT CHANGE UP (ref 3.5–5.3)
POTASSIUM SERPL-SCNC: 3.6 MMOL/L — SIGNIFICANT CHANGE UP (ref 3.5–5.3)
PROT SERPL-MCNC: 7.4 G/DL — SIGNIFICANT CHANGE UP (ref 6–8.3)
PROTHROM AB SERPL-ACNC: 14.8 SEC — HIGH (ref 9.9–13.4)
RBC # BLD: 4.77 M/UL — SIGNIFICANT CHANGE UP (ref 4.2–5.8)
RBC # FLD: 12 % — SIGNIFICANT CHANGE UP (ref 10.3–14.5)
SODIUM SERPL-SCNC: 142 MMOL/L — SIGNIFICANT CHANGE UP (ref 135–145)
TROPONIN I, HIGH SENSITIVITY RESULT: 18.6 NG/L — SIGNIFICANT CHANGE UP
WBC # BLD: 7.82 K/UL — SIGNIFICANT CHANGE UP (ref 3.8–10.5)
WBC # FLD AUTO: 7.82 K/UL — SIGNIFICANT CHANGE UP (ref 3.8–10.5)

## 2024-10-25 NOTE — ED PROVIDER NOTE - PATIENT PORTAL LINK FT
You can access the FollowMyHealth Patient Portal offered by [facility] by registering at the following website: [email or web address]. By joining [employer]’s FollowMyHealth portal, you will also be able to view your health information using other applications (apps) compatible with our system.

## 2024-10-25 NOTE — ED PROVIDER NOTE - NSFOLLOWUPINSTRUCTIONS_ED_ALL_ED_FT
Dizziness    Dizziness is a common problem. It makes you feel unsteady or light-headed. You may feel like you are about to pass out (faint). Dizziness can lead to getting hurt if you stumble or fall. Dizziness can be caused by many things, including:  •Medicines.  •Not having enough water in your body (dehydration).  •Illness.    Follow these instructions at home:  Eating and drinking     •Drink enough fluid to keep your pee (urine) pale yellow. This helps to keep you from getting dehydrated. Try to drink more clear fluids, such as water.    • Do not drink alcohol.  •Limit how much caffeine you drink or eat, if your doctor tells you to do that.  •Limit how much salt (sodium) you drink or eat, if your doctor tells you to do that.    Activity   A sign showing that a person should not drive. •Avoid making quick movements.  •Stand up slowly from sitting in a chair, and steady yourself until you feel okay.  •In the morning, first sit up on the side of the bed. When you feel okay, stand up slowly while you hold onto something. Do this until you know that your balance is okay.  •If you need to  one place for a long time, move your legs often. Tighten and relax the muscles in your legs while you are standing.  • Do not drive or use machinery if you feel dizzy.  •Avoid bending down if you feel dizzy. Place items in your home so you can reach them easily without leaning over.    Lifestyle   • Do not smoke or use any products that contain nicotine or tobacco. If you need help quitting, ask your doctor.  •Try to lower your stress level. You can do this by using methods such as yoga or meditation. Talk with your doctor if you need help.    General instructions   •Watch your dizziness for any changes.  •Take over-the-counter and prescription medicines only as told by your doctor. Talk with your doctor if you think that you are dizzy because of a medicine that you are taking.  •Tell a friend or a family member that you are feeling dizzy. If he or she notices any changes in your behavior, have this person call your doctor.  •Keep all follow-up visits.    Contact a doctor if:  •Your dizziness does not go away.  •Your dizziness or light-headedness gets worse.  •You feel like you may vomit (are nauseous).  •You have trouble hearing.  •You have new symptoms.  •You are unsteady on your feet.  •You feel like the room is spinning.  •You have neck pain or a stiff neck.  •You have a fever.    Get help right away if:  •You vomit or have watery poop (diarrhea), and you cannot eat or drink anything.  You have trouble:  •Talking.  •Walking.  •Swallowing.  •Using your arms, hands, or legs.  •You feel generally weak.  •You are not thinking clearly, or you have trouble forming sentences. A friend or family member may notice this.    You have:  •Chest pain.  •Pain in your belly (abdomen).  •Shortness of breath.  •Sweating.  •Your vision changes.  •You are bleeding.  •You have a very bad headache.    These symptoms may be an emergency. Get help right away. Call your local emergency services (911 in the [place]   • Do not wait to see if the symptoms will go away.    • Do not drive yourself to the hospital.     Summary  •Dizziness makes you feel unsteady or light-headed. You may feel like you are about to pass out (faint).  •Drink enough fluid to keep your pee (urine) pale yellow. Do not drink alcohol.  •Avoid making quick movements if you feel dizzy.  •Watch your dizziness for any changes.    This information is not intended to replace advice given to you by your health care provider. Make sure you discuss any questions you have with your health care provider.    Document Revised: 11/22/2021 Document Reviewed: 11/22/2021    Elsevier Patient Education © 2022 Else[employer] Inc. Start daily aspirin 81 mg  Dizziness    Dizziness is a common problem. It makes you feel unsteady or light-headed. You may feel like you are about to pass out (faint). Dizziness can lead to getting hurt if you stumble or fall. Dizziness can be caused by many things, including:  •Medicines.  •Not having enough water in your body (dehydration).  •Illness.    Follow these instructions at home:  Eating and drinking     •Drink enough fluid to keep your pee (urine) pale yellow. This helps to keep you from getting dehydrated. Try to drink more clear fluids, such as water.    • Do not drink alcohol.  •Limit how much caffeine you drink or eat, if your doctor tells you to do that.  •Limit how much salt (sodium) you drink or eat, if your doctor tells you to do that.    Activity   A sign showing that a person should not drive. •Avoid making quick movements.  •Stand up slowly from sitting in a chair, and steady yourself until you feel okay.  •In the morning, first sit up on the side of the bed. When you feel okay, stand up slowly while you hold onto something. Do this until you know that your balance is okay.  •If you need to  one place for a long time, move your legs often. Tighten and relax the muscles in your legs while you are standing.  • Do not drive or use machinery if you feel dizzy.  •Avoid bending down if you feel dizzy. Place items in your home so you can reach them easily without leaning over.    Lifestyle   • Do not smoke or use any products that contain nicotine or tobacco. If you need help quitting, ask your doctor.  •Try to lower your stress level. You can do this by using methods such as yoga or meditation. Talk with your doctor if you need help.    General instructions   •Watch your dizziness for any changes.  •Take over-the-counter and prescription medicines only as told by your doctor. Talk with your doctor if you think that you are dizzy because of a medicine that you are taking.  •Tell a friend or a family member that you are feeling dizzy. If he or she notices any changes in your behavior, have this person call your doctor.  •Keep all follow-up visits.    Contact a doctor if:  •Your dizziness does not go away.  •Your dizziness or light-headedness gets worse.  •You feel like you may vomit (are nauseous).  •You have trouble hearing.  •You have new symptoms.  •You are unsteady on your feet.  •You feel like the room is spinning.  •You have neck pain or a stiff neck.  •You have a fever.    Get help right away if:  •You vomit or have watery poop (diarrhea), and you cannot eat or drink anything.  You have trouble:  •Talking.  •Walking.  •Swallowing.  •Using your arms, hands, or legs.  •You feel generally weak.  •You are not thinking clearly, or you have trouble forming sentences. A friend or family member may notice this.    You have:  •Chest pain.  •Pain in your belly (abdomen).  •Shortness of breath.  •Sweating.  •Your vision changes.  •You are bleeding.  •You have a very bad headache.    These symptoms may be an emergency. Get help right away. Call your local emergency services (911 in the [place]   • Do not wait to see if the symptoms will go away.    • Do not drive yourself to the hospital.     Summary  •Dizziness makes you feel unsteady or light-headed. You may feel like you are about to pass out (faint).  •Drink enough fluid to keep your pee (urine) pale yellow. Do not drink alcohol.  •Avoid making quick movements if you feel dizzy.  •Watch your dizziness for any changes.    This information is not intended to replace advice given to you by your health care provider. Make sure you discuss any questions you have with your health care provider.    Document Revised: 11/22/2021 Document Reviewed: 11/22/2021    Elsevier Patient Education © 2022 Elsevier Inc.

## 2024-10-25 NOTE — ED PROVIDER NOTE - CARE PROVIDER_API CALL
paramjit,   Phone: (   )    -  Fax: (   )    -  Follow Up Time: 4-6 Days    Clive Rodríguez  [id number] [place] [address]  Phone: [phone]  Fax: [phone]  Follow Up Time: 7-10 Days

## 2024-10-25 NOTE — ED PROVIDER NOTE - OBJECTIVE STATEMENT
Patient with a past medical history of coronary artery disease, prior stroke with residual numbness, early onset dementia is presenting with son with concern for dizziness.  Son states this morning around 10:00, patient had an episode where he felt the room spinning, had nausea and vomiting associated with this.  No reported chest pain or headache.  By the time of EMS arrival and be brought to the ER, patient appeared to be back to baseline.  No history of similar episodes.  No reported shortness of breath with the episode.  No recent fevers.  Has otherwise been well.  Appears to be normal state of health for send at bedside.

## 2024-10-25 NOTE — ED PROVIDER NOTE - CLINICAL SUMMARY MEDICAL DECISION MAKING FREE TEXT BOX
Patient here currently with an NIH stroke scale of 0, suspect likely vertigo given transient episode today.  However with age and comorbidities, will check CT to assess for underlying vascular pathology and possible stroke.  Also given his history, will check labs and EKG to evaluate for atypical ACS though reassuring his ECG here shows a rate of 71, normal sinus rhythm with a normal axis and no ST segment elevation consistent with ischemia.  Will check for electrolyte derangement as well.  Plan on lab work, imaging, and reassessment.

## 2024-10-25 NOTE — ED PROVIDER NOTE - PROVIDER TOKENS
FREE:[LAST:[stamatis],PHONE:[(   )    -],FAX:[(   )    -],FOLLOWUP:[4-6 Days]],PROVIDER:[TOKEN:[[id number]],FOLLOWUP:[7-10 Days]]

## 2024-10-25 NOTE — ED ADULT NURSE NOTE - NSFALLRISKINTERV_ED_ALL_ED

## 2024-10-25 NOTE — ED PROVIDER NOTE - PROGRESS NOTE DETAILS
I received phone call from the radiologist regarding this patient's CT CTA.  The CT shows no acute stroke.  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  Given no acute stroke on the noncontrasted study appears that this might be chronic findings.  These results were printed and reviewed with the patient and the family member at the bedside. CT from prior admission in August was reviewed medical record #[id number].  This CT angio/MRA reveals similar distal V4 occlusion.  The son was comfortable with this.  He wants to take the patient home as the patient begins to sundown at night.  Counseled on a daily aspirin, and need for follow-up.  Neurology will be provided, and son was informed on to call 911 if any acute stroke symptoms recur.

## 2024-10-25 NOTE — ED ADULT TRIAGE NOTE - CHIEF COMPLAINT QUOTE
69y M BIBEMS from home (lives alone with an aide/caregiver) .. pt called son/EMS for weakness, N/V, dizziness... pt hx dementia, baseline confused per son

## 2024-10-25 NOTE — ED PROVIDER NOTE - PHYSICAL EXAMINATION
Constitutional: Awake, Alert, non-toxic. No acute distress.  HEAD: Normocephalic, atraumatic.   EYES: PERRL, EOM intact, conjunctiva and sclera are clear bilaterally.  ENT: External ears normal. No rhinorrhea, no tracheal deviation   NECK: Supple, non-tender  CARDIOVASCULAR: regular rate and rhythm.  RESPIRATORY: Normal respiratory effort; breath sounds CTAB, no wheezes, rhonchi, or rales. Speaking in full sentences. No accessory muscle use.   ABDOMEN: Soft; non-tender, non-distended. No rebound or guarding.   MSK:  no lower extremity edema, no deformities  SKIN: Warm, dry  NEURO: A&O x2 (baseline per son). Sensory and motor functions are grossly intact. Speech is normal. CN 2-12 in tact. No facial droop. Normal finger to nose. Negative pronator drift. no drift of lower extremities. 5/5 strength in bilateral upper and lower extremities. Sensation in tact to light touch in bilateral upper and lower extremities. no reproducible dizziness  PSYCH: Appearance and judgement seem appropriate for gender and age.

## 2024-12-17 NOTE — H&P ADULT - PROBLEM SELECTOR PLAN 1
Lower Patient states that he saw his cardiologist 3 weeks ago & he told him hat he has a "healthy heart", rate controlled with IVP Cardizem 10 mg X1 dose by ED team, currently in A. Fib with frequent SN captures, admitted to telemetry, on Atenolol at home stating that he missed only tonight dose, resumed, and will continue at same dose, trend Troponin, TSH & TTE in am, Patient with [id number] score of 4, but not convinced regarding long term anticoagulation, as his cardiologist told him he has a healthy heart, will leave long term anticoagulation to be discussed with his cardiologist in am, consult with Dr. Potts was called.

## 2025-04-02 ENCOUNTER — NON-APPOINTMENT (OUTPATIENT)
Age: 70
End: 2025-04-02

## 2025-04-02 ENCOUNTER — APPOINTMENT (OUTPATIENT)
Dept: OTOLARYNGOLOGY | Facility: CLINIC | Age: 70
End: 2025-04-02

## 2025-04-02 VITALS
WEIGHT: 169 LBS | HEART RATE: 75 BPM | SYSTOLIC BLOOD PRESSURE: 122 MMHG | HEIGHT: 70 IN | DIASTOLIC BLOOD PRESSURE: 72 MMHG | BODY MASS INDEX: 24.2 KG/M2

## 2025-04-02 DIAGNOSIS — H61.20 IMPACTED CERUMEN, UNSPECIFIED EAR: ICD-10-CM

## 2025-04-02 DIAGNOSIS — Z78.9 OTHER SPECIFIED HEALTH STATUS: ICD-10-CM

## 2025-04-02 PROCEDURE — 99203 OFFICE O/P NEW LOW 30 MIN: CPT | Mod: 25

## 2025-04-02 PROCEDURE — 69210 REMOVE IMPACTED EAR WAX UNI: CPT

## 2025-04-02 RX ORDER — TAMSULOSIN HYDROCHLORIDE 0.4 MG/1
0.4 CAPSULE ORAL
Refills: 0 | Status: ACTIVE | COMMUNITY

## 2025-04-02 RX ORDER — ASPIRIN 81 MG/1
81 TABLET ORAL
Refills: 0 | Status: ACTIVE | COMMUNITY

## 2025-04-02 RX ORDER — DILTIAZEM HYDROCHLORIDE 300 MG/1
300 CAPSULE, COATED, EXTENDED RELEASE ORAL
Refills: 0 | Status: ACTIVE | COMMUNITY

## 2025-04-02 RX ORDER — QUETIAPINE FUMARATE 25 MG/1
25 TABLET ORAL
Refills: 0 | Status: ACTIVE | COMMUNITY

## 2025-04-02 RX ORDER — RAMIPRIL 5 MG/1
5 CAPSULE ORAL
Refills: 0 | Status: ACTIVE | COMMUNITY

## 2025-04-02 RX ORDER — MEMANTINE HYDROCHLORIDE 28 MG/1
28 CAPSULE, EXTENDED RELEASE ORAL
Refills: 0 | Status: ACTIVE | COMMUNITY

## 2025-04-02 RX ORDER — ROSUVASTATIN CALCIUM 5 MG/1
TABLET, FILM COATED ORAL
Refills: 0 | Status: ACTIVE | COMMUNITY

## 2025-06-24 ENCOUNTER — APPOINTMENT (OUTPATIENT)
Dept: OTOLARYNGOLOGY | Facility: CLINIC | Age: 70
End: 2025-06-24

## 2025-08-20 ENCOUNTER — APPOINTMENT (OUTPATIENT)
Dept: MRI IMAGING | Facility: CLINIC | Age: 70
End: 2025-08-20

## 2025-08-20 PROCEDURE — 70551 MRI BRAIN STEM W/O DYE: CPT
